# Patient Record
Sex: FEMALE | Race: WHITE | NOT HISPANIC OR LATINO | Employment: FULL TIME | URBAN - METROPOLITAN AREA
[De-identification: names, ages, dates, MRNs, and addresses within clinical notes are randomized per-mention and may not be internally consistent; named-entity substitution may affect disease eponyms.]

---

## 2017-03-09 ENCOUNTER — APPOINTMENT (OUTPATIENT)
Dept: CT IMAGING | Facility: HOSPITAL | Age: 52
End: 2017-03-09

## 2017-03-09 ENCOUNTER — HOSPITAL ENCOUNTER (EMERGENCY)
Facility: HOSPITAL | Age: 52
Discharge: HOME | End: 2017-03-09
Attending: EMERGENCY MEDICINE | Admitting: EMERGENCY MEDICINE

## 2017-03-09 VITALS
WEIGHT: 160 LBS | DIASTOLIC BLOOD PRESSURE: 87 MMHG | OXYGEN SATURATION: 94 % | RESPIRATION RATE: 16 BRPM | TEMPERATURE: 98.1 F | HEART RATE: 80 BPM | BODY MASS INDEX: 25.71 KG/M2 | SYSTOLIC BLOOD PRESSURE: 144 MMHG | HEIGHT: 66 IN

## 2017-03-09 DIAGNOSIS — R10.11 RIGHT UPPER QUADRANT ABDOMINAL PAIN: ICD-10-CM

## 2017-03-09 DIAGNOSIS — R10.9 FLANK PAIN: Primary | ICD-10-CM

## 2017-03-09 DIAGNOSIS — R07.81 RIB PAIN ON RIGHT SIDE: ICD-10-CM

## 2017-03-09 LAB
ALBUMIN SERPL-MCNC: 4.8 G/DL (ref 3.2–4.8)
ALBUMIN/GLOB SERPL: 1.4 G/DL (ref 1.5–2.5)
ALP SERPL-CCNC: 53 U/L (ref 25–100)
ALT SERPL W P-5'-P-CCNC: 15 U/L (ref 7–40)
ANION GAP SERPL CALCULATED.3IONS-SCNC: 4 MMOL/L (ref 3–11)
AST SERPL-CCNC: 25 U/L (ref 0–33)
BACTERIA UR QL AUTO: ABNORMAL /HPF
BASOPHILS # BLD AUTO: 0.07 10*3/MM3 (ref 0–0.2)
BASOPHILS NFR BLD AUTO: 0.5 % (ref 0–1)
BILIRUB SERPL-MCNC: 0.6 MG/DL (ref 0.3–1.2)
BILIRUB UR QL STRIP: NEGATIVE
BUN BLD-MCNC: 15 MG/DL (ref 9–23)
BUN/CREAT SERPL: 25 (ref 7–25)
CALCIUM SPEC-SCNC: 10.1 MG/DL (ref 8.7–10.4)
CHLORIDE SERPL-SCNC: 106 MMOL/L (ref 99–109)
CLARITY UR: ABNORMAL
CO2 SERPL-SCNC: 31 MMOL/L (ref 20–31)
COLOR UR: YELLOW
CREAT BLD-MCNC: 0.6 MG/DL (ref 0.6–1.3)
DEPRECATED RDW RBC AUTO: 44.1 FL (ref 37–54)
EOSINOPHIL # BLD AUTO: 0.25 10*3/MM3 (ref 0.1–0.3)
EOSINOPHIL NFR BLD AUTO: 1.9 % (ref 0–3)
ERYTHROCYTE [DISTWIDTH] IN BLOOD BY AUTOMATED COUNT: 13.3 % (ref 11.3–14.5)
GFR SERPL CREATININE-BSD FRML MDRD: 106 ML/MIN/1.73
GLOBULIN UR ELPH-MCNC: 3.5 GM/DL
GLUCOSE BLD-MCNC: 121 MG/DL (ref 70–100)
GLUCOSE UR STRIP-MCNC: NEGATIVE MG/DL
HCT VFR BLD AUTO: 41.2 % (ref 34.5–44)
HGB BLD-MCNC: 13.2 G/DL (ref 11.5–15.5)
HGB UR QL STRIP.AUTO: NEGATIVE
HOLD SPECIMEN: NORMAL
HOLD SPECIMEN: NORMAL
HYALINE CASTS UR QL AUTO: ABNORMAL /LPF
IMM GRANULOCYTES # BLD: 0.02 10*3/MM3 (ref 0–0.03)
IMM GRANULOCYTES NFR BLD: 0.2 % (ref 0–0.6)
KETONES UR QL STRIP: NEGATIVE
LEUKOCYTE ESTERASE UR QL STRIP.AUTO: NEGATIVE
LIPASE SERPL-CCNC: 32 U/L (ref 6–51)
LYMPHOCYTES # BLD AUTO: 2.25 10*3/MM3 (ref 0.6–4.8)
LYMPHOCYTES NFR BLD AUTO: 17.2 % (ref 24–44)
MCH RBC QN AUTO: 29.1 PG (ref 27–31)
MCHC RBC AUTO-ENTMCNC: 32 G/DL (ref 32–36)
MCV RBC AUTO: 90.7 FL (ref 80–99)
MONOCYTES # BLD AUTO: 0.96 10*3/MM3 (ref 0–1)
MONOCYTES NFR BLD AUTO: 7.3 % (ref 0–12)
NEUTROPHILS # BLD AUTO: 9.55 10*3/MM3 (ref 1.5–8.3)
NEUTROPHILS NFR BLD AUTO: 72.9 % (ref 41–71)
NITRITE UR QL STRIP: NEGATIVE
PH UR STRIP.AUTO: 7.5 [PH] (ref 5–8)
PLATELET # BLD AUTO: 369 10*3/MM3 (ref 150–450)
PMV BLD AUTO: 11.3 FL (ref 6–12)
POTASSIUM BLD-SCNC: 3.6 MMOL/L (ref 3.5–5.5)
PROT SERPL-MCNC: 8.3 G/DL (ref 5.7–8.2)
PROT UR QL STRIP: NEGATIVE
RBC # BLD AUTO: 4.54 10*6/MM3 (ref 3.89–5.14)
RBC # UR: ABNORMAL /HPF
REF LAB TEST METHOD: ABNORMAL
SODIUM BLD-SCNC: 141 MMOL/L (ref 132–146)
SP GR UR STRIP: 1.02 (ref 1–1.03)
SQUAMOUS #/AREA URNS HPF: ABNORMAL /HPF
UROBILINOGEN UR QL STRIP: ABNORMAL
WBC NRBC COR # BLD: 13.1 10*3/MM3 (ref 3.5–10.8)
WBC UR QL AUTO: ABNORMAL /HPF
WHOLE BLOOD HOLD SPECIMEN: NORMAL
WHOLE BLOOD HOLD SPECIMEN: NORMAL

## 2017-03-09 PROCEDURE — 85025 COMPLETE CBC W/AUTO DIFF WBC: CPT

## 2017-03-09 PROCEDURE — 96374 THER/PROPH/DIAG INJ IV PUSH: CPT

## 2017-03-09 PROCEDURE — 83690 ASSAY OF LIPASE: CPT

## 2017-03-09 PROCEDURE — 81001 URINALYSIS AUTO W/SCOPE: CPT

## 2017-03-09 PROCEDURE — 80053 COMPREHEN METABOLIC PANEL: CPT

## 2017-03-09 PROCEDURE — 99284 EMERGENCY DEPT VISIT MOD MDM: CPT

## 2017-03-09 PROCEDURE — 96375 TX/PRO/DX INJ NEW DRUG ADDON: CPT

## 2017-03-09 PROCEDURE — 25010000002 KETOROLAC TROMETHAMINE PER 15 MG: Performed by: EMERGENCY MEDICINE

## 2017-03-09 PROCEDURE — 96361 HYDRATE IV INFUSION ADD-ON: CPT

## 2017-03-09 PROCEDURE — 25010000002 METHYLPREDNISOLONE PER 125 MG: Performed by: EMERGENCY MEDICINE

## 2017-03-09 PROCEDURE — 74176 CT ABD & PELVIS W/O CONTRAST: CPT

## 2017-03-09 PROCEDURE — 25010000002 DIAZEPAM PER 5 MG: Performed by: EMERGENCY MEDICINE

## 2017-03-09 RX ORDER — DIAZEPAM 2 MG/1
2 TABLET ORAL EVERY 6 HOURS PRN
Qty: 12 TABLET | Refills: 0 | Status: SHIPPED | OUTPATIENT
Start: 2017-03-09 | End: 2020-06-22

## 2017-03-09 RX ORDER — MORPHINE SULFATE 2 MG/ML
2 INJECTION, SOLUTION INTRAMUSCULAR; INTRAVENOUS
Status: DISCONTINUED | OUTPATIENT
Start: 2017-03-09 | End: 2017-03-09 | Stop reason: HOSPADM

## 2017-03-09 RX ORDER — KETOROLAC TROMETHAMINE 30 MG/ML
30 INJECTION, SOLUTION INTRAMUSCULAR; INTRAVENOUS ONCE
Status: COMPLETED | OUTPATIENT
Start: 2017-03-09 | End: 2017-03-09

## 2017-03-09 RX ORDER — DIAZEPAM 5 MG/ML
5 INJECTION, SOLUTION INTRAMUSCULAR; INTRAVENOUS ONCE
Status: COMPLETED | OUTPATIENT
Start: 2017-03-09 | End: 2017-03-09

## 2017-03-09 RX ORDER — HYDROCODONE BITARTRATE AND ACETAMINOPHEN 5; 325 MG/1; MG/1
1-2 TABLET ORAL EVERY 6 HOURS PRN
Qty: 15 TABLET | Refills: 0 | Status: SHIPPED | OUTPATIENT
Start: 2017-03-09 | End: 2020-06-22

## 2017-03-09 RX ORDER — SODIUM CHLORIDE 0.9 % (FLUSH) 0.9 %
10 SYRINGE (ML) INJECTION AS NEEDED
Status: DISCONTINUED | OUTPATIENT
Start: 2017-03-09 | End: 2017-03-09 | Stop reason: HOSPADM

## 2017-03-09 RX ORDER — NAPROXEN 500 MG/1
500 TABLET ORAL 2 TIMES DAILY PRN
Qty: 12 TABLET | Refills: 0 | Status: SHIPPED | OUTPATIENT
Start: 2017-03-09 | End: 2022-04-25

## 2017-03-09 RX ORDER — CETIRIZINE HYDROCHLORIDE 10 MG/1
10 TABLET ORAL DAILY
COMMUNITY

## 2017-03-09 RX ORDER — METHYLPREDNISOLONE SODIUM SUCCINATE 125 MG/2ML
125 INJECTION, POWDER, LYOPHILIZED, FOR SOLUTION INTRAMUSCULAR; INTRAVENOUS ONCE
Status: COMPLETED | OUTPATIENT
Start: 2017-03-09 | End: 2017-03-09

## 2017-03-09 RX ORDER — ERGOCALCIFEROL (VITAMIN D2) 10 MCG
400 TABLET ORAL DAILY
COMMUNITY
End: 2020-06-22

## 2017-03-09 RX ORDER — PREDNISONE 50 MG/1
50 TABLET ORAL DAILY
Qty: 5 TABLET | Refills: 0 | Status: SHIPPED | OUTPATIENT
Start: 2017-03-09 | End: 2017-03-14

## 2017-03-09 RX ADMIN — SODIUM CHLORIDE 1000 ML: 9 INJECTION, SOLUTION INTRAVENOUS at 11:58

## 2017-03-09 RX ADMIN — DIAZEPAM 5 MG: 5 INJECTION, SOLUTION INTRAMUSCULAR; INTRAVENOUS at 11:59

## 2017-03-09 RX ADMIN — METHYLPREDNISOLONE SODIUM SUCCINATE 125 MG: 125 INJECTION, POWDER, FOR SOLUTION INTRAMUSCULAR; INTRAVENOUS at 11:58

## 2017-03-09 RX ADMIN — KETOROLAC TROMETHAMINE 30 MG: 30 INJECTION, SOLUTION INTRAMUSCULAR at 11:59

## 2017-03-09 NOTE — ED PROVIDER NOTES
Subjective   HPI Comments: Diana Fulton is a 51 y.o.female who presents to the ED c/o worsening, severe right flank pain, which onset 1 week ago. She states her pain will sometimes radiate forward to her abd, but notes she has had a cholecystectomy. She has vomited from the pain and has experienced chills, but denies any fevers, diarrhea, or urinary sx. Pt notes she has had these sx previously, which lasted approximately 2 months and resolved on its own. She saw Dr. Jackson after her pain had resolved who reviewed her MRI scan and indicated she had bulging discs which likely caused her sx. Pt states her pain from the feels identical to her pain today.    Patient is a 51 y.o. female presenting with flank pain.   History provided by:  Patient  Flank Pain   Pain location:  R flank  Pain radiates to:  RUQ  Pain severity:  Severe  Duration:  1 week  Timing:  Constant  Progression:  Worsening  Chronicity:  Recurrent  Associated symptoms: chills and vomiting    Associated symptoms: no diarrhea, no dysuria and no fever        Review of Systems   Constitutional: Positive for chills. Negative for fever.   Gastrointestinal: Positive for vomiting. Negative for diarrhea.   Genitourinary: Positive for flank pain. Negative for difficulty urinating and dysuria.   All other systems reviewed and are negative.      Past Medical History   Diagnosis Date   • Anxiety    • Bulging lumbar disc        No Known Allergies    Past Surgical History   Procedure Laterality Date   • Cholecystectomy     •  section     • Hysterectomy     • Tonsillectomy         Family History   Problem Relation Age of Onset   • Hypertension Father    • ALS Mother        Social History     Social History   • Marital status: Single     Spouse name: N/A   • Number of children: N/A   • Years of education: N/A     Social History Main Topics   • Smoking status: Former Smoker     Quit date: 2007   • Smokeless tobacco: None   • Alcohol use Yes      Comment:  moderate   • Drug use: No   • Sexual activity: Not Asked     Other Topics Concern   • None     Social History Narrative   • None         Objective   Physical Exam   Constitutional: She is oriented to person, place, and time. She appears well-developed and well-nourished.   HENT:   Head: Normocephalic and atraumatic.   Right Ear: External ear normal.   Left Ear: External ear normal.   Nose: Nose normal.   Eyes: Conjunctivae and EOM are normal.   Neck: Normal range of motion. Neck supple.   Cardiovascular: Normal rate, regular rhythm and normal heart sounds.  Exam reveals no gallop and no friction rub.    No murmur heard.  Pulmonary/Chest: Effort normal and breath sounds normal. No respiratory distress. She has no wheezes. She has no rales. She exhibits tenderness (Pain is reproducible with palpation of right 10th-12th ribs.).   Abdominal: Soft. There is no tenderness (No pain with palpation.).   Musculoskeletal: Normal range of motion.   Neurological: She is alert and oriented to person, place, and time.   Skin: Skin is warm and dry. No rash (No rash appreciated at site of reproducible chest pain.) noted.   Psychiatric: She has a normal mood and affect. Her behavior is normal. Judgment and thought content normal.   Nursing note and vitals reviewed.      Procedures         ED Course  ED Course   Comment By Time   Pt states she has temporary relief with IV medication, but pain is starting to come back. She is comfortable with outpatient management and she will return for worsening conditions.  ALISON Carroll 03/09 1402     Recent Results (from the past 24 hour(s))   Comprehensive Metabolic Panel    Collection Time: 03/09/17 11:34 AM   Result Value Ref Range    Glucose 121 (H) 70 - 100 mg/dL    BUN 15 9 - 23 mg/dL    Creatinine 0.60 0.60 - 1.30 mg/dL    Sodium 141 132 - 146 mmol/L    Potassium 3.6 3.5 - 5.5 mmol/L    Chloride 106 99 - 109 mmol/L    CO2 31.0 20.0 - 31.0 mmol/L    Calcium 10.1 8.7 - 10.4  mg/dL    Total Protein 8.3 (H) 5.7 - 8.2 g/dL    Albumin 4.80 3.20 - 4.80 g/dL    ALT (SGPT) 15 7 - 40 U/L    AST (SGOT) 25 0 - 33 U/L    Alkaline Phosphatase 53 25 - 100 U/L    Total Bilirubin 0.6 0.3 - 1.2 mg/dL    eGFR Non African Amer 106 >60 mL/min/1.73    Globulin 3.5 gm/dL    A/G Ratio 1.4 (L) 1.5 - 2.5 g/dL    BUN/Creatinine Ratio 25.0 7.0 - 25.0    Anion Gap 4.0 3.0 - 11.0 mmol/L   Lipase    Collection Time: 03/09/17 11:34 AM   Result Value Ref Range    Lipase 32 6 - 51 U/L   Urinalysis With / Culture If Indicated    Collection Time: 03/09/17 11:34 AM   Result Value Ref Range    Color, UA Yellow Yellow, Straw    Appearance, UA Cloudy (A) Clear    pH, UA 7.5 5.0 - 8.0    Specific Gravity, UA 1.018 1.001 - 1.030    Glucose, UA Negative Negative    Ketones, UA Negative Negative    Bilirubin, UA Negative Negative    Blood, UA Negative Negative    Protein, UA Negative Negative    Leuk Esterase, UA Negative Negative    Nitrite, UA Negative Negative    Urobilinogen, UA 1.0 E.U./dL 0.2 - 1.0 E.U./dL   Light Blue Top    Collection Time: 03/09/17 11:34 AM   Result Value Ref Range    Extra Tube hold for add-on    Green Top (Gel)    Collection Time: 03/09/17 11:34 AM   Result Value Ref Range    Extra Tube Hold for add-ons.    Lavender Top    Collection Time: 03/09/17 11:34 AM   Result Value Ref Range    Extra Tube hold for add-on    Gold Top - SST    Collection Time: 03/09/17 11:34 AM   Result Value Ref Range    Extra Tube Hold for add-ons.    CBC Auto Differential    Collection Time: 03/09/17 11:34 AM   Result Value Ref Range    WBC 13.10 (H) 3.50 - 10.80 10*3/mm3    RBC 4.54 3.89 - 5.14 10*6/mm3    Hemoglobin 13.2 11.5 - 15.5 g/dL    Hematocrit 41.2 34.5 - 44.0 %    MCV 90.7 80.0 - 99.0 fL    MCH 29.1 27.0 - 31.0 pg    MCHC 32.0 32.0 - 36.0 g/dL    RDW 13.3 11.3 - 14.5 %    RDW-SD 44.1 37.0 - 54.0 fl    MPV 11.3 6.0 - 12.0 fL    Platelets 369 150 - 450 10*3/mm3    Neutrophil % 72.9 (H) 41.0 - 71.0 %    Lymphocyte %  17.2 (L) 24.0 - 44.0 %    Monocyte % 7.3 0.0 - 12.0 %    Eosinophil % 1.9 0.0 - 3.0 %    Basophil % 0.5 0.0 - 1.0 %    Immature Grans % 0.2 0.0 - 0.6 %    Neutrophils, Absolute 9.55 (H) 1.50 - 8.30 10*3/mm3    Lymphocytes, Absolute 2.25 0.60 - 4.80 10*3/mm3    Monocytes, Absolute 0.96 0.00 - 1.00 10*3/mm3    Eosinophils, Absolute 0.25 0.10 - 0.30 10*3/mm3    Basophils, Absolute 0.07 0.00 - 0.20 10*3/mm3    Immature Grans, Absolute 0.02 0.00 - 0.03 10*3/mm3   Urinalysis, Microscopic Only    Collection Time: 03/09/17 11:34 AM   Result Value Ref Range    RBC, UA 3-6 (A) None Seen, 0-2 /HPF    WBC, UA 0-2 (A) None Seen /HPF    Bacteria, UA None Seen None Seen, Trace /HPF    Squamous Epithelial Cells, UA 0-2 None Seen, 0-2 /HPF    Hyaline Casts, UA 0-6 0 - 6 /LPF    Methodology Automated Microscopy      Note: In addition to lab results from this visit, the labs listed above may include labs taken at another facility or during a different encounter within the last 24 hours. Please correlate lab times with ED admission and discharge times for further clarification of the services performed during this visit.    CT Abdomen Pelvis Without Contrast   Final Result   There is a 3.4 cm left ovarian cyst. There is no other   abnormality.       D:  03/09/2017   E:  03/09/2017           This report was finalized on 3/9/2017 1:51 PM by Dr. Evan Brown MD.            Vitals:    03/09/17 1223 03/09/17 1230 03/09/17 1330 03/09/17 1430   BP: 129/74 118/84 136/83 144/87   BP Location:       Patient Position:       Pulse:       Resp:       Temp:       TempSrc:       SpO2: 96% 95% 95% 94%   Weight:       Height:         Medications   sodium chloride 0.9 % bolus 1,000 mL (0 mL Intravenous Stopped 3/9/17 1452)   methylPREDNISolone sodium succinate (SOLU-Medrol) injection 125 mg (125 mg Intravenous Given 3/9/17 1158)   diazePAM (VALIUM) injection 5 mg (5 mg Intravenous Given 3/9/17 1159)   ketorolac (TORADOL) injection 30 mg (30 mg  Intravenous Given 3/9/17 1159)     ECG/EMG Results (last 24 hours)     ** No results found for the last 24 hours. **        As pt has no abdominal pain with palpation, imaging is not indicated at this time.  Pain is fully reproduced with palpation of the right lower posterior ribs.                MDM    Final diagnoses:   Flank pain   Rib pain on right side   Right upper quadrant abdominal pain       Documentation assistance provided by felipa Carroll.  Information recorded by the [email or web address] was done at my direction and has been verified and validated by me.     Vidal Carroll  03/09/17 1235       Vidal Carroll  03/09/17 1402       Vidal Carroll  03/09/17 1405       Jose Ramon Christensen DO  03/09/17 0864

## 2017-03-17 ENCOUNTER — OFFICE VISIT (OUTPATIENT)
Dept: NEUROSURGERY | Facility: CLINIC | Age: 52
End: 2017-03-17

## 2017-03-17 VITALS — HEIGHT: 66 IN | BODY MASS INDEX: 30.7 KG/M2 | TEMPERATURE: 97.8 F | WEIGHT: 191 LBS

## 2017-03-17 DIAGNOSIS — M54.50 CHRONIC RIGHT-SIDED LOW BACK PAIN WITHOUT SCIATICA: Primary | ICD-10-CM

## 2017-03-17 DIAGNOSIS — G89.29 CHRONIC RIGHT-SIDED LOW BACK PAIN WITHOUT SCIATICA: Primary | ICD-10-CM

## 2017-03-17 PROCEDURE — 99215 OFFICE O/P EST HI 40 MIN: CPT | Performed by: NEUROLOGICAL SURGERY

## 2017-03-17 NOTE — PROGRESS NOTES
Subjective     Chief Complaint: Right-sided low back pain    Patient ID: Diana Fulton is a 51 y.o. female is here today for follow-up.    Back Pain   This is a chronic problem. The current episode started more than 1 month ago. The problem occurs daily. The problem has been waxing and waning since onset. The pain is present in the lumbar spine. The quality of the pain is described as aching and cramping. The pain does not radiate. The pain is at a severity of 9/10. The pain is severe. The pain is worse during the day. The symptoms are aggravated by twisting and sitting. Stiffness is present in the morning. Pertinent negatives include no abdominal pain, bladder incontinence, bowel incontinence, chest pain, dysuria, fever, headaches, leg pain, numbness, paresis, paresthesias, pelvic pain, perianal numbness, tingling, weakness or weight loss. Risk factors include lack of exercise and sedentary lifestyle. She has tried chiropractic manipulation, heat and NSAIDs for the symptoms. The treatment provided mild relief.       This is a 51-year-old woman with a chronic history of intermittent, right sided low back pain without sciatica.  I saw her in consultation several months ago, but her pain was getting better, so she deferred my recommendation for physical therapy.  She had a flareup several weeks ago which landed her in the emergency department.  She reports that her pain is gotten a little bit better, but she is eager to try some different therapies to prevent any more flareups.  She denies any bladder/bowel incontinence.  She has been walking, and undergoing some chiropractic therapy but has not undergone any physical therapy.    The following portions of the patient's history were reviewed and updated as appropriate: allergies, current medications, past family history, past medical history, past social history, past surgical history and problem list.    Family history:   Family History   Problem Relation Age of  Onset   • Hypertension Father    • ALS Mother        Social history:   Social History     Social History   • Marital status: Single     Spouse name: N/A   • Number of children: N/A   • Years of education: N/A     Occupational History   • Not on file.     Social History Main Topics   • Smoking status: Former Smoker     Quit date: 8/26/2007   • Smokeless tobacco: Not on file   • Alcohol use Yes      Comment: moderate   • Drug use: No   • Sexual activity: Not on file     Other Topics Concern   • Not on file     Social History Narrative       Review of Systems   Constitutional: Negative for activity change, appetite change, chills, diaphoresis, fatigue, fever, unexpected weight change and weight loss.   HENT: Negative for congestion, dental problem, drooling, ear discharge, ear pain, facial swelling, hearing loss, mouth sores, nosebleeds, postnasal drip, rhinorrhea, sinus pressure, sneezing, sore throat, tinnitus, trouble swallowing and voice change.    Eyes: Negative for photophobia, pain, discharge, redness, itching and visual disturbance.   Respiratory: Negative for apnea, cough, choking, chest tightness, shortness of breath, wheezing and stridor.    Cardiovascular: Negative for chest pain, palpitations and leg swelling.   Gastrointestinal: Negative for abdominal distention, abdominal pain, anal bleeding, blood in stool, bowel incontinence, constipation, diarrhea, nausea, rectal pain and vomiting.   Endocrine: Negative for cold intolerance, heat intolerance, polydipsia, polyphagia and polyuria.   Genitourinary: Negative for bladder incontinence, decreased urine volume, difficulty urinating, dysuria, enuresis, flank pain, frequency, genital sores, hematuria, pelvic pain and urgency.   Musculoskeletal: Positive for back pain. Negative for arthralgias, gait problem, joint swelling, myalgias, neck pain and neck stiffness.   Skin: Negative for color change, pallor, rash and wound.   Allergic/Immunologic: Negative for  "environmental allergies, food allergies and immunocompromised state.   Neurological: Negative for dizziness, tingling, tremors, seizures, syncope, facial asymmetry, speech difficulty, weakness, light-headedness, numbness, headaches and paresthesias.   Hematological: Negative for adenopathy. Does not bruise/bleed easily.   Psychiatric/Behavioral: Positive for sleep disturbance. Negative for agitation, behavioral problems, confusion, decreased concentration, dysphoric mood, hallucinations, self-injury and suicidal ideas. The patient is not nervous/anxious and is not hyperactive.    All other systems reviewed and are negative.      Objective   Height 66\" (167.6 cm), weight 160 lb (72.6 kg).  Body mass index is 25.82 kg/(m^2).    Physical Exam   Constitutional: She is oriented to person, place, and time. She appears well-developed.  Non-toxic appearance.   HENT:   Head: Normocephalic and atraumatic.   Right Ear: Hearing normal.   Left Ear: Hearing normal.   Nose: Nose normal.   Eyes: Conjunctivae, EOM and lids are normal. Pupils are equal, round, and reactive to light.   Neck: Normal range of motion. No JVD present.   Cardiovascular: Normal rate and regular rhythm.    Pulses:       Radial pulses are 2+ on the right side, and 2+ on the left side.        Dorsalis pedis pulses are 1+ on the right side, and 1+ on the left side.        Posterior tibial pulses are 1+ on the right side, and 1+ on the left side.   Pulmonary/Chest: Effort normal. No stridor. No respiratory distress. She has no wheezes.   Musculoskeletal:        Lumbar back: She exhibits decreased range of motion, tenderness and pain.        Back:    Neurological: She is alert and oriented to person, place, and time. She displays abnormal reflex. No cranial nerve deficit. She exhibits normal muscle tone. GCS eye subscore is 4. GCS verbal subscore is 5. GCS motor subscore is 6.   Reflex Scores:       Patellar reflexes are 1+ on the right side and 1+ on the left " side.       Achilles reflexes are 1+ on the right side and 1+ on the left side.  Skin: Skin is warm and dry. No rash noted. No erythema.   Psychiatric: She has a normal mood and affect. Her behavior is normal. Judgment and thought content normal.   Nursing note and vitals reviewed.        Assessment/Plan     Independent Review of Radiographic Studies:      She has no new imaging for me to review.  She has an unremarkable MRI of her lumbar spine, with some mild degenerative disc disease, but nothing that would explain her current symptomatology.    Medical Decision Making:      This is a 51-year-old woman with recurrent low back pain.  My recommendation is for aggressive physical therapy.  There is no structural etiology for her low back pain on her most recent MRI, and her symptoms have not significantly changed.  My suspicion is that she is having spasm of her quadratus lumborum or oblique muscles on the right side.  She is an excellent candidate for physical therapy.  I would like to follow up with her in 6 months, or sooner if clinically indicated.  I did review the signs and symptoms of lumbosacral radiculopathy with her.  I directed her to contact my office with new, or worsening symptoms.    There are no diagnoses linked to this encounter.    No Follow-up on file.           This document signed by MARIO Jackson MD March 17, 2017 10:06 AM

## 2020-06-22 ENCOUNTER — OFFICE VISIT (OUTPATIENT)
Dept: ENDOCRINOLOGY | Facility: CLINIC | Age: 55
End: 2020-06-22

## 2020-06-22 VITALS
OXYGEN SATURATION: 98 % | HEIGHT: 66 IN | BODY MASS INDEX: 33.59 KG/M2 | HEART RATE: 76 BPM | WEIGHT: 209 LBS | SYSTOLIC BLOOD PRESSURE: 110 MMHG | DIASTOLIC BLOOD PRESSURE: 80 MMHG

## 2020-06-22 DIAGNOSIS — E04.2 MULTINODULAR GOITER (NONTOXIC): Primary | ICD-10-CM

## 2020-06-22 PROCEDURE — 76536 US EXAM OF HEAD AND NECK: CPT | Performed by: INTERNAL MEDICINE

## 2020-06-22 PROCEDURE — 99243 OFF/OP CNSLTJ NEW/EST LOW 30: CPT | Performed by: INTERNAL MEDICINE

## 2020-06-22 RX ORDER — ESTRADIOL 10 UG/1
1 INSERT VAGINAL AS NEEDED
COMMUNITY
Start: 2020-04-28

## 2020-07-05 PROBLEM — E04.2 MULTINODULAR GOITER (NONTOXIC): Status: ACTIVE | Noted: 2020-07-05

## 2020-07-05 NOTE — PROGRESS NOTES
"Chief Complaint   Patient presents with   • Thyroid nodule     Consult for Olivia ARTEAGA        HPI:   Diana Fulton is a 55 y.o.female sent in consultation by CHANDLER Gerber for further evaluation of pt's thyroid gland. Her history is as follows:    - Patient reports that she was found to have a positive hep C antibody test during evaluation for hematuria.  She was sent to hepatologist for further evaluation who felt a goiter on physical exam.  This was further evaluated with a thyroid ultrasound at [facility] completed on 2020.  The radiology report's impression stated \"there are multiple bilateral thyroid nodules.  These are of low risk and no additional follow-up is required.\"  (2020) TSH was normal at 0.94    -Patient was sent in consultation for further evaluation of her thyroid gland.     FMH: no known thyroid cancer, no known thyroid disease  PMH: no h/o irradiation of head, neck, or chest    Review of Systems   Constitutional: Negative.    HENT: Negative.    Eyes: Negative.    Respiratory: Negative.    Cardiovascular: Negative.    Gastrointestinal: Negative.    Endocrine: Negative.    Genitourinary: Negative.    Musculoskeletal: Positive for arthralgias and back pain.   Skin: Negative.    Allergic/Immunologic: Negative.    Neurological: Negative.    Hematological: Negative.    Psychiatric/Behavioral: Negative.         H/o anxiety       Past Medical History:   Diagnosis Date   • Anxiety    • Bulging lumbar disc    • Depression    • Joint pain     elbows and knees    • Rash      family history includes ALS in her mother; Cancer in her maternal grandmother and paternal grandfather; Hypertension in her father.  Past Surgical History:   Procedure Laterality Date   •  SECTION     • CHOLECYSTECTOMY     • HYSTERECTOMY     • TONSILLECTOMY       Social History     Tobacco Use   • Smoking status: Former Smoker     Last attempt to quit: 2007     " "Years since quittin.8   • Smokeless tobacco: Never Used   Substance Use Topics   • Alcohol use: Yes     Comment: moderate   • Drug use: No     Outpatient Medications Prior to Visit   Medication Sig Dispense Refill   • cetirizine (zyrTEC) 10 MG tablet Take 10 mg by mouth Daily.     • estradiol (VAGIFEM) 10 MCG tablet vaginal tablet      • hydrOXYzine (VISTARIL) 25 MG capsule Take 25 mg by mouth every 6 (six) hours as needed for itching.     • Multiple Vitamins-Minerals (MULTIVITAMIN ADULT PO) Take  by mouth.     • naproxen (NAPROSYN) 500 MG tablet Take 1 tablet by mouth 2 (Two) Times a Day As Needed for moderate pain (4-6). 12 tablet 0   • cyclobenzaprine (FLEXERIL) 10 MG tablet Take 10 mg by mouth 3 (three) times a day as needed for muscle spasms.     • diazePAM (VALIUM) 2 MG tablet Take 1 tablet by mouth Every 6 (Six) Hours As Needed for Anxiety. 12 tablet 0   • DICLOFENAC PO Take 50 mg by mouth 2 (two) times a day as needed.     • HYDROcodone-acetaminophen (NORCO) 5-325 MG per tablet Take 1-2 tablets by mouth Every 6 (Six) Hours As Needed for severe pain (7-10). 15 tablet 0   • sertraline (ZOLOFT) 50 MG tablet Take 50 mg by mouth daily. 1.5 tabs     • Vitamin D, Cholecalciferol, (CHOLECALCIFEROL) 400 UNITS tablet Take 400 Units by mouth Daily.       No facility-administered medications prior to visit.      No Known Allergies    /80   Pulse 76   Ht 167.6 cm (66\")   Wt 94.8 kg (209 lb)   SpO2 98%   BMI 33.73 kg/m²   Physical Exam   Constitutional: She is oriented to person, place, and time. She appears well-developed. No distress.   HENT:   Head: Normocephalic.   Mouth/Throat: Oropharynx is clear and moist.   Eyes: Pupils are equal, round, and reactive to light. Conjunctivae and EOM are normal.   Neck: No tracheal deviation present. Thyromegaly (right lobe mildly enlarged) present.   1 cm palpable thyroid nodule in right lobe     Cardiovascular: Normal rate, regular rhythm and normal heart sounds. "   No murmur heard.  Pulmonary/Chest: Effort normal and breath sounds normal. No respiratory distress.   Lymphadenopathy:     She has no cervical adenopathy.   Neurological: She is alert and oriented to person, place, and time. No cranial nerve deficit.   Skin: Skin is warm and dry. She is not diaphoretic. No erythema.   Psychiatric: She has a normal mood and affect. Her behavior is normal.   Vitals reviewed.      LABS/IMAGING: outside records reviewed and summarized in HPI    PROCEDURES (in office):  Thyroid Ultrasound Report  [facility]  [place], [place]    Date: 06/22/2020  Indication: thyroid nodule  Comparison Imaging: none  Clinical History: 55 y.o. Female with h/o thyroid nodule on outside imaging. H/O normal thyroid function tests.     Real time high resolution imaging of the thyroid gland was performed in transverse and longitudinal planes.  The right lobe measured 4.78 cm in Length x 1.87 cm in AP diameter x 2.34 cm in TV dimension.  The isthmus measured 0.26 cm in thickness.  The left thyroid lobe measured 4.89 cm in length x 1.44 cm in AP diameter x 1.72 cm in TV dimension.    The thyroid gland appeared overall isoechoic with slightly heterogeneous echotexture.    In the right mid lobe, a 0.89(L) x 0.70(AP) x 0.81(T) cm isoechoic, mixed solid and cystic nodule was identified. The nodule had a smooth margin, minimal peripheral vascularity, and no microcalcifications.    In the right inferior lobe, a 1.35(L) x 0.89(AP) x 1.15(T) cm isoechoic, spongiform nodule was identified. The nodule had a smooth margin, peripheral vascularity, and no microcalcifications.     At the junction of the right lobe and isthmus, a 1.07(L) x 0.74(AP) x 1.05(T) cm isoechoic, spongiform nodule was identified. The nodule had a smooth margin, peripheral vascularity, and no microcalcifications.     At the junction of the right lobe and isthmus, a subcentimeter predominantly cystic nodule was identified. The cystic  nodule had a smooth margin, no vascularity, and no microcalcifications.     A few subcentimeter cysts were seen scattered throughout the left lobe.     No pathologic lymph nodes were seen.     IMPRESSION:   1) Asymmetric, mildly enlarged thyroid gland with the right lobe mildly enlarged and the left lobe normal in size. Findings were consistent with multinodular goiter.    2) A few subcentimeter cysts were seen scattered throughout the left lobe.   3) In the right mid lobe, a 0.89(L) x 0.70(AP) x 0.81(T) cm isoechoic, mixed solid and cystic nodule was identified. The nodule had a smooth margin, minimal peripheral vascularity, and no microcalcifications.  4) At the junction of the right lobe and isthmus, a 1.07(L) x 0.74(AP) x 1.05(T) cm isoechoic, spongiform nodule was identified. The nodule had a smooth margin, peripheral vascularity, and no microcalcifications.   5) The nodules identified lacked suspicious US characteristics and did not meet criteria for FNA.    RECOMMENDATIONS: Repeat thyroid US recommended to the patient in one year.      Signed: Marce Savage MD      ASSESSMENT/PLAN:    1) multinodular goiter, nontoxic, nonobstructive: Thyroid ultrasound completed in clinic today showed -   - Asymmetric, mildly enlarged thyroid gland with the right lobe mildly enlarged and the left lobe normal in size. Findings were consistent with multinodular goiter.    - A few subcentimeter cysts were seen scattered throughout the left lobe.   - In the right mid lobe, a 0.89(L) x 0.70(AP) x 0.81(T) cm isoechoic, mixed solid and cystic nodule was identified. The nodule had a smooth margin, minimal peripheral vascularity, and no microcalcifications.  - At the junction of the right lobe and isthmus, a 1.07(L) x 0.74(AP) x 1.05(T) cm isoechoic, spongiform nodule was identified. The nodule had a smooth margin, peripheral vascularity, and no microcalcifications.   - The nodules identified lacked suspicious US characteristics and  did not meet criteria for FNA.    RECOMMENDATIONS: Repeat thyroid US recommended to the patient in one year.     Patient scheduled to return to clinic in 1 year for follow-up ultrasound.    Reviewed ultrasound images with patient.  Reassured patient that her nodules have benign appearing ultrasound characteristics.    Counseling was given to patient for the following topics: clinical significance of thyroid nodules, risk factors for thyroid cancer, indications for FNA, and reviewing US images with patient . Total face to face time of the encounter was 40 minutes and 30 minutes was spent counseling.      Signed: Marce Savage MD

## 2020-07-05 NOTE — PROGRESS NOTES
Thyroid Ultrasound Report  [facility]  [place], [place]    Date: 06/22/2020  Indication: thyroid nodule  Comparison Imaging: none  Clinical History: 55 y.o. Female with h/o thyroid nodule on outside imaging. H/O normal thyroid function tests.     Real time high resolution imaging of the thyroid gland was performed in transverse and longitudinal planes.  The right lobe measured 4.78 cm in Length x 1.87 cm in AP diameter x 2.34 cm in TV dimension.  The isthmus measured 0.26 cm in thickness.  The left thyroid lobe measured 4.89 cm in length x 1.44 cm in AP diameter x 1.72 cm in TV dimension.    The thyroid gland appeared overall isoechoic with slightly heterogeneous echotexture.    In the right mid lobe, a 0.89(L) x 0.70(AP) x 0.81(T) cm isoechoic, mixed solid and cystic nodule was identified. The nodule had a smooth margin, minimal peripheral vascularity, and no microcalcifications.    In the right inferior lobe, a 1.35(L) x 0.89(AP) x 1.15(T) cm isoechoic, spongiform nodule was identified. The nodule had a smooth margin, peripheral vascularity, and no microcalcifications.     At the junction of the right lobe and isthmus, a 1.07(L) x 0.74(AP) x 1.05(T) cm isoechoic, spongiform nodule was identified. The nodule had a smooth margin, peripheral vascularity, and no microcalcifications.     At the junction of the right lobe and isthmus, a subcentimeter predominantly cystic nodule was identified. The cystic nodule had a smooth margin, no vascularity, and no microcalcifications.     A few subcentimeter cysts were seen scattered throughout the left lobe.     No pathologic lymph nodes were seen.     IMPRESSION:   1) Asymmetric, mildly enlarged thyroid gland with the right lobe mildly enlarged and the left lobe normal in size. Findings were consistent with multinodular goiter.    2) A few subcentimeter cysts were seen scattered throughout the left lobe.   3) In the right mid lobe, a 0.89(L) x 0.70(AP) x 0.81(T)  cm isoechoic, mixed solid and cystic nodule was identified. The nodule had a smooth margin, minimal peripheral vascularity, and no microcalcifications.  4) At the junction of the right lobe and isthmus, a 1.07(L) x 0.74(AP) x 1.05(T) cm isoechoic, spongiform nodule was identified. The nodule had a smooth margin, peripheral vascularity, and no microcalcifications.   5) The nodules identified lacked suspicious US characteristics and did not meet criteria for FNA.    RECOMMENDATIONS: Repeat thyroid US recommended to the patient in one year.      Signed: Marce Savage MD

## 2022-04-21 ENCOUNTER — HOSPITAL ENCOUNTER (EMERGENCY)
Facility: HOSPITAL | Age: 57
Discharge: HOME | End: 2022-04-21
Attending: EMERGENCY MEDICINE | Admitting: EMERGENCY MEDICINE

## 2022-04-21 ENCOUNTER — APPOINTMENT (OUTPATIENT)
Dept: GENERAL RADIOLOGY | Facility: HOSPITAL | Age: 57
End: 2022-04-21

## 2022-04-21 VITALS
RESPIRATION RATE: 18 BRPM | DIASTOLIC BLOOD PRESSURE: 69 MMHG | TEMPERATURE: 98.3 F | BODY MASS INDEX: 34.32 KG/M2 | HEART RATE: 84 BPM | HEIGHT: 65 IN | WEIGHT: 206 LBS | OXYGEN SATURATION: 94 % | SYSTOLIC BLOOD PRESSURE: 125 MMHG

## 2022-04-21 DIAGNOSIS — R07.9 CHEST PAIN, UNSPECIFIED TYPE: Primary | ICD-10-CM

## 2022-04-21 LAB
ALBUMIN SERPL-MCNC: 4.5 G/DL (ref 3.5–5.2)
ALBUMIN/GLOB SERPL: 1.8 G/DL
ALP SERPL-CCNC: 57 U/L (ref 39–117)
ALT SERPL W P-5'-P-CCNC: 12 U/L (ref 1–33)
ANION GAP SERPL CALCULATED.3IONS-SCNC: 10 MMOL/L (ref 5–15)
AST SERPL-CCNC: 21 U/L (ref 1–32)
BASOPHILS # BLD AUTO: 0.09 10*3/MM3 (ref 0–0.2)
BASOPHILS NFR BLD AUTO: 1.1 % (ref 0–1.5)
BILIRUB SERPL-MCNC: 0.2 MG/DL (ref 0–1.2)
BUN SERPL-MCNC: 16 MG/DL (ref 6–20)
BUN/CREAT SERPL: 21.3 (ref 7–25)
CALCIUM SPEC-SCNC: 9.5 MG/DL (ref 8.6–10.5)
CHLORIDE SERPL-SCNC: 106 MMOL/L (ref 98–107)
CO2 SERPL-SCNC: 25 MMOL/L (ref 22–29)
CREAT SERPL-MCNC: 0.75 MG/DL (ref 0.57–1)
DEPRECATED RDW RBC AUTO: 41.1 FL (ref 37–54)
EGFRCR SERPLBLD CKD-EPI 2021: 94.7 ML/MIN/1.73
EOSINOPHIL # BLD AUTO: 0.19 10*3/MM3 (ref 0–0.4)
EOSINOPHIL NFR BLD AUTO: 2.3 % (ref 0.3–6.2)
ERYTHROCYTE [DISTWIDTH] IN BLOOD BY AUTOMATED COUNT: 12.6 % (ref 12.3–15.4)
GLOBULIN UR ELPH-MCNC: 2.5 GM/DL
GLUCOSE SERPL-MCNC: 100 MG/DL (ref 65–99)
HCT VFR BLD AUTO: 37.9 % (ref 34–46.6)
HGB BLD-MCNC: 12.8 G/DL (ref 12–15.9)
HOLD SPECIMEN: NORMAL
IMM GRANULOCYTES # BLD AUTO: 0.01 10*3/MM3 (ref 0–0.05)
IMM GRANULOCYTES NFR BLD AUTO: 0.1 % (ref 0–0.5)
LIPASE SERPL-CCNC: 30 U/L (ref 13–60)
LYMPHOCYTES # BLD AUTO: 2.15 10*3/MM3 (ref 0.7–3.1)
LYMPHOCYTES NFR BLD AUTO: 25.8 % (ref 19.6–45.3)
MCH RBC QN AUTO: 30.1 PG (ref 26.6–33)
MCHC RBC AUTO-ENTMCNC: 33.8 G/DL (ref 31.5–35.7)
MCV RBC AUTO: 89.2 FL (ref 79–97)
MONOCYTES # BLD AUTO: 0.72 10*3/MM3 (ref 0.1–0.9)
MONOCYTES NFR BLD AUTO: 8.6 % (ref 5–12)
NEUTROPHILS NFR BLD AUTO: 5.18 10*3/MM3 (ref 1.7–7)
NEUTROPHILS NFR BLD AUTO: 62.1 % (ref 42.7–76)
NRBC BLD AUTO-RTO: 0 /100 WBC (ref 0–0.2)
NT-PROBNP SERPL-MCNC: 34 PG/ML (ref 0–900)
PLATELET # BLD AUTO: 298 10*3/MM3 (ref 140–450)
PMV BLD AUTO: 11.3 FL (ref 6–12)
POTASSIUM SERPL-SCNC: 4.1 MMOL/L (ref 3.5–5.2)
PROT SERPL-MCNC: 7 G/DL (ref 6–8.5)
RBC # BLD AUTO: 4.25 10*6/MM3 (ref 3.77–5.28)
SODIUM SERPL-SCNC: 141 MMOL/L (ref 136–145)
TROPONIN T SERPL-MCNC: <0.01 NG/ML (ref 0–0.03)
TROPONIN T SERPL-MCNC: <0.01 NG/ML (ref 0–0.03)
WBC NRBC COR # BLD: 8.34 10*3/MM3 (ref 3.4–10.8)
WHOLE BLOOD HOLD SPECIMEN: NORMAL
WHOLE BLOOD HOLD SPECIMEN: NORMAL

## 2022-04-21 PROCEDURE — 36415 COLL VENOUS BLD VENIPUNCTURE: CPT

## 2022-04-21 PROCEDURE — 83690 ASSAY OF LIPASE: CPT | Performed by: EMERGENCY MEDICINE

## 2022-04-21 PROCEDURE — 99284 EMERGENCY DEPT VISIT MOD MDM: CPT

## 2022-04-21 PROCEDURE — 71045 X-RAY EXAM CHEST 1 VIEW: CPT

## 2022-04-21 PROCEDURE — 83880 ASSAY OF NATRIURETIC PEPTIDE: CPT | Performed by: EMERGENCY MEDICINE

## 2022-04-21 PROCEDURE — 84484 ASSAY OF TROPONIN QUANT: CPT | Performed by: EMERGENCY MEDICINE

## 2022-04-21 PROCEDURE — 93005 ELECTROCARDIOGRAM TRACING: CPT | Performed by: EMERGENCY MEDICINE

## 2022-04-21 PROCEDURE — 80053 COMPREHEN METABOLIC PANEL: CPT | Performed by: EMERGENCY MEDICINE

## 2022-04-21 PROCEDURE — 85025 COMPLETE CBC W/AUTO DIFF WBC: CPT | Performed by: EMERGENCY MEDICINE

## 2022-04-21 RX ORDER — ALBUTEROL SULFATE 90 UG/1
2 AEROSOL, METERED RESPIRATORY (INHALATION) EVERY 6 HOURS PRN
Qty: 18 G | Refills: 0 | Status: SHIPPED | OUTPATIENT
Start: 2022-04-21

## 2022-04-21 RX ORDER — SODIUM CHLORIDE 0.9 % (FLUSH) 0.9 %
10 SYRINGE (ML) INJECTION AS NEEDED
Status: DISCONTINUED | OUTPATIENT
Start: 2022-04-21 | End: 2022-04-21 | Stop reason: HOSPADM

## 2022-04-21 RX ORDER — ASPIRIN 81 MG/1
324 TABLET, CHEWABLE ORAL ONCE
Status: DISCONTINUED | OUTPATIENT
Start: 2022-04-21 | End: 2022-04-21 | Stop reason: HOSPADM

## 2022-04-21 RX ORDER — NITROGLYCERIN 0.4 MG/1
0.4 TABLET SUBLINGUAL
Status: DISCONTINUED | OUTPATIENT
Start: 2022-04-21 | End: 2022-04-21 | Stop reason: HOSPADM

## 2022-04-21 RX ADMIN — NITROGLYCERIN 0.4 MG: 0.4 TABLET SUBLINGUAL at 17:59

## 2022-04-21 NOTE — DISCHARGE INSTRUCTIONS
Examination and testing today did not reveal a specific cause of your chest pain.  It is important to understand that this does not mean “nothing is wrong” but rather that there is currently no evidence to support a specific diagnosis.  Some diseases, like heart disease, can be very serious but develop slowly over time and often the testing available in the ED will be “normal” or “nondiagnostic” even when significant blockage in the arteries is present.  This is why it is very important for you to have close followup for further testing, for example a stress test.  You are being referred to our chest pain followup clinic to help facilitate and expedite this further testing.  Please do not put off or delay further evaluation as the consequences may be severe.  If at any time you have worsening symptoms or develop any change in your condition that concerns you, please return to the ED for immediate evaluation.

## 2022-04-22 LAB
QT INTERVAL: 366 MS
QT INTERVAL: 386 MS
QTC INTERVAL: 427 MS
QTC INTERVAL: 445 MS

## 2022-04-22 NOTE — ED PROVIDER NOTES
Subjective   Pt presents with chest pain.  She has had several days of waxing and waning tightness associated with dyspena.  Not exertional.  No sweating, dizziness or nausea.  No fever, cough, myalgias or sore throat.  Hasn't tried anything for symptoms.  She went to [facility] today and they sent her here.    She has new dx breast cancer, she says stage 0 DCIS and she is scheduled for a lumpectomy later this month.      History provided by:  Patient      Review of Systems   Constitutional: Negative for diaphoresis, fatigue and fever.   Respiratory: Positive for shortness of breath. Negative for cough.    Cardiovascular: Positive for chest pain.   Gastrointestinal: Negative for nausea and vomiting.   Musculoskeletal: Negative for myalgias.   Neurological: Negative for dizziness.   All other systems reviewed and are negative.      Past Medical History:   Diagnosis Date   • Anxiety    • Bulging lumbar disc    • Depression    • Joint pain     elbows and knees    • Rash        No Known Allergies    Past Surgical History:   Procedure Laterality Date   •  SECTION     • CHOLECYSTECTOMY     • HYSTERECTOMY     • TONSILLECTOMY         Family History   Problem Relation Age of Onset   • Hypertension Father    • ALS Mother    • Cancer Maternal Grandmother    • Cancer Paternal Grandfather        Social History     Socioeconomic History   • Marital status: Single   Tobacco Use   • Smoking status: Former Smoker     Quit date: 2007     Years since quittin.6   • Smokeless tobacco: Never Used   Substance and Sexual Activity   • Alcohol use: Yes     Comment: moderate   • Drug use: No   • Sexual activity: Defer           Objective   Physical Exam  Vitals and nursing note reviewed.   Constitutional:       General: She is not in acute distress.     Appearance: Normal appearance. She is not ill-appearing.   HENT:      Head: Normocephalic and atraumatic.      Mouth/Throat:      Mouth: Mucous membranes are moist.   Eyes:       General: No scleral icterus.        Right eye: No discharge.         Left eye: No discharge.      Conjunctiva/sclera: Conjunctivae normal.   Cardiovascular:      Rate and Rhythm: Normal rate and regular rhythm.      Heart sounds: No murmur heard.  Pulmonary:      Effort: Pulmonary effort is normal. No respiratory distress.      Breath sounds: Normal breath sounds. No wheezing.   Abdominal:      General: Bowel sounds are normal. There is no distension.      Palpations: Abdomen is soft.      Tenderness: There is no abdominal tenderness. There is no guarding or rebound.   Musculoskeletal:         General: No swelling. Normal range of motion.      Cervical back: Normal range of motion and neck supple.   Skin:     General: Skin is warm and dry.      Findings: No rash.   Neurological:      General: No focal deficit present.      Mental Status: She is alert and oriented to person, place, and time. Mental status is at baseline.   Psychiatric:         Mood and Affect: Mood normal.         Behavior: Behavior normal.         Thought Content: Thought content normal.         Procedures           ED Course         EKG NSR, PRWP.  Reviewed UTC EKG, there are no concerning changes.  CXR NAD.  Labs benign.  No change with NTG.    HEART = 3.    Patient stable on serial rechecks.  Discussed findings, concerns, plan of care, expected course, reasons to return and followup.  Provided the opportunity to ask questions.                                          MDM  Number of Diagnoses or Management Options     Amount and/or Complexity of Data Reviewed  Clinical lab tests: reviewed and ordered  Tests in the radiology section of CPT®: reviewed and ordered  Decide to obtain previous medical records or to obtain history from someone other than the patient: yes  Review and summarize past medical records: yes  Independent visualization of images, tracings, or specimens: yes        Final diagnoses:   Chest pain, unspecified type       ED  Disposition  ED Disposition     ED Disposition   Discharge    Condition   Stable    Comment   --             [facility]  [address]  [address]  [facility] [address] [phone]  Schedule an appointment as soon as possible for a visit            Medication List      New Prescriptions    albuterol sulfate  (90 Base) MCG/ACT inhaler  Commonly known as: PROVENTIL HFA;VENTOLIN HFA;PROAIR HFA  Inhale 2 puffs Every 6 (Six) Hours As Needed for Wheezing.           Where to Get Your Medications      These medications were sent to [facility] [address] [place] [address] - [facility] [address] - [phone]  - [phone]   [address], [facility] [address]    Phone: [phone]   · albuterol sulfate  (90 Base) MCG/ACT inhaler          Dipak Roberts MD  04/22/22 0012

## 2022-04-22 NOTE — PROGRESS NOTES
"[facility]  Heart [facility] Val[facility]    Chief Complaint  Chest Pain and Establish Care    Subjective    History of Present Illness {CC  Problem List  Visit  Diagnosis   Encounters  Notes  Medications  Labs  Result Review Imaging  Media :23}     Diana Fulton is a 56 y.o. female with anxiety/depression who presents today for evaluation of chest pain at the request with Dr. Roberts. Patient presented to the ED 4/21 with a one week hx of intermittent chest tightness and associated dyspnea. Denies exertional chest pain.  Describes it as a constant \"vice\" around her chest. She notes associated \"heart pounding\". She admits to increased stress. Worse around bedtime. She says she couldn't sleep because her heart is pounding. Feels like she cannot \"get enough oxygen in her lungs\" but denies exertional dyspnea. She notes chronic intermittent palpitations and has worn heart monitors in the past. Has never had a stress test.  Denies feeling of an irregular heartbeat.  She reports she recent was diagnosed with breast cancer and has a lumpectomy scheduled for Friday.  She will also require radiation.  She reports the thought of this is causing her a significant amount of anxiety       Cardiac risks: age, obesity    Objective     Vital Signs:   Vitals:    04/25/22 0756 04/25/22 0757 04/25/22 0758   BP: 143/83 143/79 130/74   BP Location: Right arm Left arm Left arm   Patient Position: Sitting Standing Sitting   Cuff Size: Adult Adult Adult   Pulse: 81 89 83   Resp:   16   Temp: 97.2 °F (36.2 °C) 97.3 °F (36.3 °C) 97.3 °F (36.3 °C)   TempSrc: Temporal Temporal Temporal   SpO2: 100% 99% 99%   Weight:   94.9 kg (209 lb 3 oz)   Height:   165.1 cm (65\")     Body mass index is 34.81 kg/m².  Physical Exam  Vitals reviewed.   Constitutional:       Appearance: Normal appearance.   HENT:      Head: Normocephalic.   Neck:      Vascular: No carotid bruit.   Cardiovascular:      Rate and Rhythm: Normal rate and " regular rhythm.      Pulses: Normal pulses.      Heart sounds: Normal heart sounds, S1 normal and S2 normal. No murmur heard.  Pulmonary:      Effort: Pulmonary effort is normal. No respiratory distress.      Breath sounds: Normal breath sounds.   Chest:      Chest wall: No tenderness.   Abdominal:      General: Abdomen is flat.      Palpations: Abdomen is soft.   Musculoskeletal:      Cervical back: Neck supple.      Right lower leg: No edema.      Left lower leg: No edema.   Skin:     General: Skin is warm and dry.   Neurological:      General: No focal deficit present.      Mental Status: She is alert and oriented to person, place, and time. Mental status is at baseline.   Psychiatric:         Mood and Affect: Mood normal.         Behavior: Behavior normal.         Thought Content: Thought content normal.              Result Review  Data Reviewed:{ Labs  Result Review  Imaging  Med Tab  Media :23}   ECG 12 Lead (04/21/2022 18:33)  ECG 12 Lead (04/21/2022 16:18)  Troponin (04/21/2022 18:36)  BNP (04/21/2022 16:28)  Lipase (04/21/2022 16:28)  Comprehensive Metabolic Panel (04/21/2022 16:28)  CBC & Differential (04/21/2022 16:28)  Troponin (04/21/2022 16:28)    Consultant notes Dr. Roberts            Assessment and Plan {CC Problem List  Visit Diagnosis  ROS  Review (Popup)  Health Maintenance  Quality  BestPractice  Medications  SmartSets  SnapShot Encounters  Media :23}   1. Chest pain, unspecified type  Overall low ASCVD risk.  Symptoms very well may be related to anxiety.  We will do a GXT for further cardiac restratification  - Treadmill Stress Test; Future  - Adult Transthoracic Echo Complete W/ Cont if Necessary Per Protocol; Future    2. Palpitations  She describes as a regular heart pounding that occurs when she is trying to go to sleep.  Likely related to anxiety.  We discussed possibly doing a heart monitor, but we have deferred due to her upcoming surgery and likelihood that it is related  to anxiety.  I did prescribe her propranolol 10 mg as needed to help night time heart pounding/anxiety. Advised her to call if symptoms persist  - Treadmill Stress Test; Future  - Adult Transthoracic Echo Complete W/ Cont if Necessary Per Protocol; Future    3. Shortness of breath    - Treadmill Stress Test; Future  - Adult Transthoracic Echo Complete W/ Cont if Necessary Per Protocol; Future    4. Elevated blood pressure without HTN  She reports that her blood pressure is typically low.  This is likely secondary to anxiety and stress. Only mildly elevated today.Advised to monitor at home, she does have a blood pressure cuff    Follow Up {Instructions Charge Capture  Follow-up Communications :23}   Return if symptoms worsen or fail to improve.    Patient was given instructions and counseling regarding her condition or for health maintenance advice. Please see specific information pulled into the AVS if appropriate.  Advised to call the Heart and Valve Center with any questions, concerns, or worsening symptoms.

## 2022-04-25 ENCOUNTER — OFFICE VISIT (OUTPATIENT)
Dept: CARDIOLOGY | Facility: HOSPITAL | Age: 57
End: 2022-04-25

## 2022-04-25 ENCOUNTER — PATIENT ROUNDING (BHMG ONLY) (OUTPATIENT)
Dept: CARDIOLOGY | Facility: HOSPITAL | Age: 57
End: 2022-04-25

## 2022-04-25 VITALS
TEMPERATURE: 97.3 F | OXYGEN SATURATION: 99 % | HEART RATE: 83 BPM | RESPIRATION RATE: 16 BRPM | DIASTOLIC BLOOD PRESSURE: 74 MMHG | WEIGHT: 209.19 LBS | BODY MASS INDEX: 34.85 KG/M2 | SYSTOLIC BLOOD PRESSURE: 130 MMHG | HEIGHT: 65 IN

## 2022-04-25 DIAGNOSIS — R00.2 PALPITATIONS: ICD-10-CM

## 2022-04-25 DIAGNOSIS — R06.02 SHORTNESS OF BREATH: ICD-10-CM

## 2022-04-25 DIAGNOSIS — R03.0 ELEVATED BLOOD-PRESSURE READING, WITHOUT DIAGNOSIS OF HYPERTENSION: ICD-10-CM

## 2022-04-25 DIAGNOSIS — R07.9 CHEST PAIN, UNSPECIFIED TYPE: Primary | ICD-10-CM

## 2022-04-25 PROCEDURE — 99214 OFFICE O/P EST MOD 30 MIN: CPT | Performed by: NURSE PRACTITIONER

## 2022-04-25 RX ORDER — LORATADINE 10 MG/1
10 TABLET ORAL DAILY
COMMUNITY

## 2022-04-25 RX ORDER — MELATONIN
1000 DAILY
COMMUNITY

## 2022-04-25 RX ORDER — CHOLECALCIFEROL (VITAMIN D3) 125 MCG
500 CAPSULE ORAL DAILY
COMMUNITY

## 2022-04-25 RX ORDER — PROPRANOLOL HYDROCHLORIDE 10 MG/1
10 TABLET ORAL 3 TIMES DAILY PRN
Qty: 60 TABLET | Refills: 0 | Status: SHIPPED | OUTPATIENT
Start: 2022-04-25

## 2022-04-25 NOTE — PROGRESS NOTES
April 25, 2022    Hello, may I speak with Diana Fulton?    My name is Leigh Ann      I am  with [facility]  [address]  [facility] [address] [phone].    Before we get started may I verify your date of birth? 1965    I am calling to officially welcome you to our practice and ask about your recent visit. Is this a good time to talk? Pt unavailable     Tell me about your visit with us. What things went well?  Pt unavailable        We're always looking for ways to make our patients' experiences even better. Do you have recommendations on ways we may improve?  pt unavailable  Overall were you satisfied with your first visit to our practice?pt unavailable       I appreciate you taking the time to speak with me today. Is there anything else I can do for you? Pt unavailable      Thank you, and have a great day.

## 2022-04-27 ENCOUNTER — HOSPITAL ENCOUNTER (OUTPATIENT)
Dept: CARDIOLOGY | Facility: HOSPITAL | Age: 57
Discharge: HOME | End: 2022-04-27

## 2022-04-27 VITALS — BODY MASS INDEX: 34.82 KG/M2 | HEIGHT: 65 IN | WEIGHT: 209 LBS

## 2022-04-27 DIAGNOSIS — R07.9 CHEST PAIN, UNSPECIFIED TYPE: ICD-10-CM

## 2022-04-27 DIAGNOSIS — R00.2 PALPITATIONS: ICD-10-CM

## 2022-04-27 DIAGNOSIS — R06.02 SHORTNESS OF BREATH: ICD-10-CM

## 2022-04-27 LAB
BH CV ECHO MEAS - AO MAX PG: 8.1 MMHG
BH CV ECHO MEAS - AO MEAN PG: 4.5 MMHG
BH CV ECHO MEAS - AO ROOT DIAM: 3.9 CM
BH CV ECHO MEAS - AO V2 MAX: 142.5 CM/SEC
BH CV ECHO MEAS - AO V2 VTI: 29.8 CM
BH CV ECHO MEAS - AVA(I,D): 2.02 CM2
BH CV ECHO MEAS - EDV(CUBED): 140.1 ML
BH CV ECHO MEAS - EDV(MOD-SP2): 71.5 ML
BH CV ECHO MEAS - EDV(MOD-SP4): 125 ML
BH CV ECHO MEAS - EF(MOD-BP): 54.9 %
BH CV ECHO MEAS - EF(MOD-SP2): 52.7 %
BH CV ECHO MEAS - EF(MOD-SP4): 57.1 %
BH CV ECHO MEAS - ESV(CUBED): 59.2 ML
BH CV ECHO MEAS - ESV(MOD-SP2): 33.8 ML
BH CV ECHO MEAS - ESV(MOD-SP4): 53.6 ML
BH CV ECHO MEAS - FS: 25 %
BH CV ECHO MEAS - IVS/LVPW: 1.12 CM
BH CV ECHO MEAS - IVSD: 0.97 CM
BH CV ECHO MEAS - LA DIMENSION: 3.3 CM
BH CV ECHO MEAS - LAT PEAK E' VEL: 13.6 CM/SEC
BH CV ECHO MEAS - LV DIASTOLIC VOL/BSA (35-75): 62 CM2
BH CV ECHO MEAS - LV MASS(C)D: 173.4 GRAMS
BH CV ECHO MEAS - LV MAX PG: 3.5 MMHG
BH CV ECHO MEAS - LV MEAN PG: 2.06 MMHG
BH CV ECHO MEAS - LV SYSTOLIC VOL/BSA (12-30): 26.6 CM2
BH CV ECHO MEAS - LV V1 MAX: 93.4 CM/SEC
BH CV ECHO MEAS - LV V1 VTI: 19.6 CM
BH CV ECHO MEAS - LVIDD: 5.2 CM
BH CV ECHO MEAS - LVIDS: 3.9 CM
BH CV ECHO MEAS - LVOT AREA: 3.1 CM2
BH CV ECHO MEAS - LVOT DIAM: 1.97 CM
BH CV ECHO MEAS - LVPWD: 0.87 CM
BH CV ECHO MEAS - MED PEAK E' VEL: 7.6 CM/SEC
BH CV ECHO MEAS - MV A MAX VEL: 76.7 CM/SEC
BH CV ECHO MEAS - MV DEC SLOPE: 486.9 CM/SEC2
BH CV ECHO MEAS - MV DEC TIME: 0.15 MSEC
BH CV ECHO MEAS - MV E MAX VEL: 70.7 CM/SEC
BH CV ECHO MEAS - MV E/A: 0.92
BH CV ECHO MEAS - MV MAX PG: 3.2 MMHG
BH CV ECHO MEAS - MV MEAN PG: 1.74 MMHG
BH CV ECHO MEAS - MV V2 VTI: 23.4 CM
BH CV ECHO MEAS - MVA(VTI): 2.6 CM2
BH CV ECHO MEAS - PA ACC TIME: 0.11 SEC
BH CV ECHO MEAS - PA PR(ACCEL): 28.3 MMHG
BH CV ECHO MEAS - RAP SYSTOLE: 3 MMHG
BH CV ECHO MEAS - RVSP: 23 MMHG
BH CV ECHO MEAS - SI(MOD-SP2): 18.7 ML/M2
BH CV ECHO MEAS - SI(MOD-SP4): 35.4 ML/M2
BH CV ECHO MEAS - SV(LVOT): 60 ML
BH CV ECHO MEAS - SV(MOD-SP2): 37.7 ML
BH CV ECHO MEAS - SV(MOD-SP4): 71.4 ML
BH CV ECHO MEAS - TAPSE (>1.6): 2.07 CM
BH CV ECHO MEAS - TR MAX PG: 20.2 MMHG
BH CV ECHO MEAS - TR MAX VEL: 224 CM/SEC
BH CV ECHO MEASUREMENTS AVERAGE E/E' RATIO: 6.67
BH CV STRESS BP STAGE 1: NORMAL
BH CV STRESS BP STAGE 2: NORMAL
BH CV STRESS DURATION MIN STAGE 1: 3
BH CV STRESS DURATION MIN STAGE 2: 3
BH CV STRESS DURATION SEC STAGE 1: 0
BH CV STRESS DURATION SEC STAGE 2: 0
BH CV STRESS GRADE STAGE 1: 10
BH CV STRESS GRADE STAGE 2: 12
BH CV STRESS HR STAGE 1: 146
BH CV STRESS HR STAGE 2: 166
BH CV STRESS METS STAGE 1: 5
BH CV STRESS METS STAGE 2: 7.5
BH CV STRESS O2 STAGE 1: 100
BH CV STRESS O2 STAGE 2: 100
BH CV STRESS PROTOCOL 1: NORMAL
BH CV STRESS RECOVERY BP: NORMAL MMHG
BH CV STRESS RECOVERY HR: 104 BPM
BH CV STRESS RECOVERY O2: 99 %
BH CV STRESS SPEED STAGE 1: 1.7
BH CV STRESS SPEED STAGE 2: 2.5
BH CV STRESS STAGE 1: 1
BH CV STRESS STAGE 2: 2
BH CV VAS BP RIGHT ARM: NORMAL MMHG
BH CV XLRA - RV BASE: 4 CM
BH CV XLRA - RV LENGTH: 8.5 CM
BH CV XLRA - RV MID: 2.7 CM
BH CV XLRA - TDI S': 9.4 CM/SEC
IVRT: 109 MSEC
LEFT ATRIUM VOLUME INDEX: 9.8 ML/M2
MAXIMAL PREDICTED HEART RATE: 166 BPM
MAXIMAL PREDICTED HEART RATE: 166 BPM
PERCENT MAX PREDICTED HR: 101.81 %
STRESS BASELINE BP: NORMAL MMHG
STRESS BASELINE HR: 106 BPM
STRESS O2 SAT REST: 100 %
STRESS PERCENT HR: 120 %
STRESS POST ESTIMATED WORKLOAD: 7 METS
STRESS POST EXERCISE DUR MIN: 6 MIN
STRESS POST EXERCISE DUR SEC: 1 SEC
STRESS POST O2 SAT PEAK: 100 %
STRESS POST PEAK BP: NORMAL MMHG
STRESS POST PEAK HR: 169 BPM
STRESS TARGET HR: 141 BPM
STRESS TARGET HR: 141 BPM

## 2022-04-27 PROCEDURE — 93306 TTE W/DOPPLER COMPLETE: CPT | Performed by: INTERNAL MEDICINE

## 2022-04-27 PROCEDURE — 93018 CV STRESS TEST I&R ONLY: CPT | Performed by: INTERNAL MEDICINE

## 2022-04-27 PROCEDURE — 93306 TTE W/DOPPLER COMPLETE: CPT

## 2022-04-27 PROCEDURE — 93017 CV STRESS TEST TRACING ONLY: CPT

## 2022-04-28 ENCOUNTER — TELEPHONE (OUTPATIENT)
Dept: CARDIOLOGY | Facility: HOSPITAL | Age: 57
End: 2022-04-28

## 2022-04-28 NOTE — TELEPHONE ENCOUNTER
Called patient with stress test and echo results. Both are within acceptable limits. She reports that she is feeling better just nervous about her upcoming lumpectomy for newly diagnosed breast cancer. Advised her that I would send stress test and echo results to Dr. Lloyd's office. Overall patient is low cardiac risk for noncardiac surgery and should be able to proceed without any further cardiac testing.  I advised her to call me if she has any recurrent symptoms

## 2022-05-11 RX ORDER — PROPRANOLOL HYDROCHLORIDE 10 MG/1
TABLET ORAL
Qty: 60 TABLET | Refills: 0 | OUTPATIENT
Start: 2022-05-11

## 2023-04-20 ENCOUNTER — OFFICE VISIT (OUTPATIENT)
Dept: FAMILY MEDICINE CLINIC | Facility: CLINIC | Age: 58
End: 2023-04-20
Payer: COMMERCIAL

## 2023-04-20 VITALS
OXYGEN SATURATION: 98 % | WEIGHT: 216 LBS | HEIGHT: 65 IN | HEART RATE: 75 BPM | BODY MASS INDEX: 35.99 KG/M2 | SYSTOLIC BLOOD PRESSURE: 130 MMHG | DIASTOLIC BLOOD PRESSURE: 80 MMHG

## 2023-04-20 DIAGNOSIS — G89.29 CHRONIC PAIN OF LEFT KNEE: ICD-10-CM

## 2023-04-20 DIAGNOSIS — Z13.220 LIPID SCREENING: ICD-10-CM

## 2023-04-20 DIAGNOSIS — D17.21 LIPOMA OF RIGHT UPPER EXTREMITY: ICD-10-CM

## 2023-04-20 DIAGNOSIS — Z11.59 ENCOUNTER FOR HEPATITIS C SCREENING TEST FOR LOW RISK PATIENT: ICD-10-CM

## 2023-04-20 DIAGNOSIS — Z00.00 ROUTINE PHYSICAL EXAMINATION: Primary | ICD-10-CM

## 2023-04-20 DIAGNOSIS — Z13.29 THYROID DISORDER SCREENING: ICD-10-CM

## 2023-04-20 DIAGNOSIS — M25.562 CHRONIC PAIN OF LEFT KNEE: ICD-10-CM

## 2023-04-20 NOTE — PROGRESS NOTES
"Chief Complaint  Annual Exam    Subjective          Diana Fulton presents to [facility]  History of Present Illness  Patient in today for routine physical exam.  She states overall has been feeling well.  She has had some issues with chronic left posterior knee pain and has felt some swelling to knee at times as well.  Denies any known injury to area.  She also states has been bothered by numerous skin lipomas but for the past year feels like the ones to her right forearm have increased in size and are causing some discomfort so is interested in getting in with general surgeon to discuss.  She states she is currently up-to-date on her mammogram and GYN exam.  She is up-to-date on her colon cancer screening.  She is up-to-date on her eye and dental exam.  Denies any changes to bowel or urine habits.  Knee Pain         Objective   Vital Signs:   /80 (BP Location: Right arm, Patient Position: Sitting, Cuff Size: Large Adult)   Pulse 75   Ht 165.1 cm (65\")   Wt 98 kg (216 lb)   SpO2 98%   BMI 35.94 kg/m²     Body mass index is 35.94 kg/m².    Review of Systems   Constitutional: Negative for fever.   HENT: Negative for congestion and sore throat.    Respiratory: Negative for cough and shortness of breath.    Cardiovascular: Negative for chest pain.   Gastrointestinal: Negative for abdominal pain, diarrhea, nausea and vomiting.   Genitourinary: Negative for dysuria and frequency.   Musculoskeletal: Positive for arthralgias and joint swelling.   Skin:        Lipoma to right forearm   Neurological: Negative for dizziness and headache.       Past History:  Medical History: has a past medical history of Anxiety, Bulging lumbar disc, Depression, Joint pain, and Rash.   Surgical History: has a past surgical history that includes Cholecystectomy;  section; Hysterectomy; and Tonsillectomy.   Family History: family history includes ALS in her mother; Cancer in her maternal " aunt, maternal aunt, maternal grandmother, and paternal grandfather; Diabetes type II in her father; Heart attack in her maternal grandmother; Heart disease in her maternal uncle; Hypertension in her father; No Known Problems in her brother, maternal grandfather, paternal grandmother, and sister.   Social History: reports that she quit smoking about 15 years ago. Her smoking use included cigarettes. She has never used smokeless tobacco. She reports current alcohol use. She reports that she does not use drugs.      Current Outpatient Medications:   •  albuterol sulfate  (90 Base) MCG/ACT inhaler, Inhale 2 puffs Every 6 (Six) Hours As Needed for Wheezing., Disp: 18 g, Rfl: 0  •  cetirizine (zyrTEC) 10 MG tablet, Take 1 tablet by mouth Daily., Disp: , Rfl:   •  cholecalciferol (VITAMIN D3) 25 MCG (1000 UT) tablet, Take 1 tablet by mouth Daily., Disp: , Rfl:   •  loratadine (CLARITIN) 10 MG tablet, Take 1 tablet by mouth Daily., Disp: , Rfl:   •  Multiple Vitamins-Minerals (MULTIVITAMIN ADULT PO), Take  by mouth., Disp: , Rfl:   •  vitamin B-12 (CYANOCOBALAMIN) 500 MCG tablet, Take 1 tablet by mouth Daily., Disp: , Rfl:   Allergies: Adhesive tape and Latex    Physical Exam  Constitutional:       Appearance: Normal appearance.   HENT:      Right Ear: Tympanic membrane normal.      Left Ear: Tympanic membrane normal.      Nose: Nose normal.      Mouth/Throat:      Mouth: Mucous membranes are moist.   Eyes:      Pupils: Pupils are equal, round, and reactive to light.   Cardiovascular:      Rate and Rhythm: Normal rate and regular rhythm.   Pulmonary:      Effort: Pulmonary effort is normal.      Breath sounds: Normal breath sounds.   Abdominal:      Palpations: Abdomen is soft.   Neurological:      Mental Status: She is alert and oriented to person, place, and time.   Psychiatric:         Mood and Affect: Mood normal.         Behavior: Behavior normal.             Assessment and Plan   Diagnoses and all orders for  this visit:    1. Routine physical examination (Primary)  -     CBC & Differential; Future  -     Comprehensive Metabolic Panel; Future  Encouraged healthy diet and exercise. Will rtc for fasting labs.   2. Chronic pain of left knee  -     XR Knee 1 or 2 View Left (In Office)  Will check xray of knee today. Discussed if negative may consider to check venous doppler to r/o baker's cyst.   3. Lipoma of right upper extremity  -     Ambulatory Referral to General Surgery  Will set up with gen surgery for further evaluation.   4. Encounter for hepatitis C screening test for low risk patient  -     Hepatitis C Antibody; Future    5. Lipid screening  -     Lipid Panel; Future    6. Thyroid disorder screening  -     TSH; Future            Follow Up   No follow-ups on file.  Patient was given instructions and counseling regarding her condition or for health maintenance advice. Please see specific information pulled into the AVS if appropriate.     TEE GarzaC

## 2023-04-25 DIAGNOSIS — G89.29 CHRONIC PAIN OF LEFT KNEE: Primary | ICD-10-CM

## 2023-04-25 DIAGNOSIS — M25.562 CHRONIC PAIN OF LEFT KNEE: Primary | ICD-10-CM

## 2023-05-04 ENCOUNTER — LAB (OUTPATIENT)
Dept: FAMILY MEDICINE CLINIC | Facility: CLINIC | Age: 58
End: 2023-05-04
Payer: COMMERCIAL

## 2023-05-04 DIAGNOSIS — Z13.29 THYROID DISORDER SCREENING: ICD-10-CM

## 2023-05-04 DIAGNOSIS — Z00.00 ROUTINE PHYSICAL EXAMINATION: ICD-10-CM

## 2023-05-04 DIAGNOSIS — Z13.220 LIPID SCREENING: ICD-10-CM

## 2023-05-04 DIAGNOSIS — Z11.59 ENCOUNTER FOR HEPATITIS C SCREENING TEST FOR LOW RISK PATIENT: ICD-10-CM

## 2023-05-04 PROCEDURE — 36415 COLL VENOUS BLD VENIPUNCTURE: CPT | Performed by: PHYSICIAN ASSISTANT

## 2023-05-05 LAB
ALBUMIN SERPL-MCNC: 4.4 G/DL (ref 3.8–4.9)
ALBUMIN/GLOB SERPL: 2 {RATIO} (ref 1.2–2.2)
ALP SERPL-CCNC: 57 IU/L (ref 44–121)
ALT SERPL-CCNC: 15 IU/L (ref 0–32)
AST SERPL-CCNC: 21 IU/L (ref 0–40)
BASOPHILS # BLD AUTO: 0.1 X10E3/UL (ref 0–0.2)
BASOPHILS NFR BLD AUTO: 1 %
BILIRUB SERPL-MCNC: 0.5 MG/DL (ref 0–1.2)
BUN SERPL-MCNC: 16 MG/DL (ref 6–24)
BUN/CREAT SERPL: 25 (ref 9–23)
CALCIUM SERPL-MCNC: 9.6 MG/DL (ref 8.7–10.2)
CHLORIDE SERPL-SCNC: 104 MMOL/L (ref 96–106)
CHOLEST SERPL-MCNC: 193 MG/DL (ref 100–199)
CO2 SERPL-SCNC: 26 MMOL/L (ref 20–29)
CREAT SERPL-MCNC: 0.65 MG/DL (ref 0.57–1)
EGFRCR SERPLBLD CKD-EPI 2021: 104 ML/MIN/1.73
EOSINOPHIL # BLD AUTO: 0.2 X10E3/UL (ref 0–0.4)
EOSINOPHIL NFR BLD AUTO: 5 %
ERYTHROCYTE [DISTWIDTH] IN BLOOD BY AUTOMATED COUNT: 12.6 % (ref 11.7–15.4)
GLOBULIN SER CALC-MCNC: 2.2 G/DL (ref 1.5–4.5)
GLUCOSE SERPL-MCNC: 90 MG/DL (ref 70–99)
HCT VFR BLD AUTO: 40.3 % (ref 34–46.6)
HDLC SERPL-MCNC: 56 MG/DL
HGB BLD-MCNC: 13.1 G/DL (ref 11.1–15.9)
IMM GRANULOCYTES # BLD AUTO: 0 X10E3/UL (ref 0–0.1)
IMM GRANULOCYTES NFR BLD AUTO: 0 %
LDLC SERPL CALC-MCNC: 115 MG/DL (ref 0–99)
LYMPHOCYTES # BLD AUTO: 1.6 X10E3/UL (ref 0.7–3.1)
LYMPHOCYTES NFR BLD AUTO: 30 %
MCH RBC QN AUTO: 29.4 PG (ref 26.6–33)
MCHC RBC AUTO-ENTMCNC: 32.5 G/DL (ref 31.5–35.7)
MCV RBC AUTO: 91 FL (ref 79–97)
MONOCYTES # BLD AUTO: 0.5 X10E3/UL (ref 0.1–0.9)
MONOCYTES NFR BLD AUTO: 10 %
NEUTROPHILS # BLD AUTO: 2.9 X10E3/UL (ref 1.4–7)
NEUTROPHILS NFR BLD AUTO: 54 %
PLATELET # BLD AUTO: 279 X10E3/UL (ref 150–450)
POTASSIUM SERPL-SCNC: 4.9 MMOL/L (ref 3.5–5.2)
PROT SERPL-MCNC: 6.6 G/DL (ref 6–8.5)
RBC # BLD AUTO: 4.45 X10E6/UL (ref 3.77–5.28)
SODIUM SERPL-SCNC: 142 MMOL/L (ref 134–144)
TRIGL SERPL-MCNC: 122 MG/DL (ref 0–149)
TSH SERPL DL<=0.005 MIU/L-ACNC: 1.16 UIU/ML (ref 0.45–4.5)
VLDLC SERPL CALC-MCNC: 22 MG/DL (ref 5–40)
WBC # BLD AUTO: 5.3 X10E3/UL (ref 3.4–10.8)

## 2023-05-23 ENCOUNTER — OFFICE VISIT (OUTPATIENT)
Dept: ORTHOPEDIC SURGERY | Facility: CLINIC | Age: 58
End: 2023-05-23
Payer: COMMERCIAL

## 2023-05-23 VITALS
SYSTOLIC BLOOD PRESSURE: 121 MMHG | DIASTOLIC BLOOD PRESSURE: 77 MMHG | WEIGHT: 208.9 LBS | HEIGHT: 65 IN | BODY MASS INDEX: 34.81 KG/M2

## 2023-05-23 DIAGNOSIS — M25.562 LEFT KNEE PAIN, UNSPECIFIED CHRONICITY: Primary | ICD-10-CM

## 2023-05-23 DIAGNOSIS — M17.12 PRIMARY OSTEOARTHRITIS OF LEFT KNEE: ICD-10-CM

## 2023-05-23 DIAGNOSIS — S83.242A ACUTE MEDIAL MENISCUS TEAR OF LEFT KNEE, INITIAL ENCOUNTER: ICD-10-CM

## 2023-05-23 RX ORDER — MELOXICAM 15 MG/1
15 TABLET ORAL DAILY
Qty: 30 TABLET | Refills: 1 | Status: SHIPPED | OUTPATIENT
Start: 2023-05-23

## 2023-05-23 RX ORDER — NAPROXEN SODIUM 220 MG
220 TABLET ORAL 2 TIMES DAILY PRN
COMMUNITY

## 2023-05-23 NOTE — PROGRESS NOTES
[facility] [place] [facility] Office Visit     Office Visit       Date: 05/23/2023   Patient Name: Diana Fulton  MRN: [id number]  YOB: 1965    Referring Physician: Olivia Byrnes PA-C     Chief Complaint:   Chief Complaint   Patient presents with   • Left Knee - Pain, Initial Evaluation       History of Present Illness:   Diana Fulton is a 58 y.o. female who presents with left knee pain for 6 month(s). Onset atraumatic and gradual in nature. Pain is localized to the medial joint line, popliteal region and is a 2/10 on the pain scale. Pain is described as Pain. Associated symptoms include pain, swelling, popping and stiffness. The pain is worse with walking, standing, sitting, sleeping and rising from seated position; resting and elevating the extremity make it better. Previous treatments have included: NSAIDS since symptom onset. Although some transient relief was reported with these interventions, these conservative measures have failed and symptoms have persisted. The patient is limited in daily activities and has had a significant decrease in quality of life as a result. She denies fevers, chills, or constitutional symptoms. Patient is a former smoker (Quit 8/26/07).    Subjective   Review of Systems: Review of Systems   Constitutional: Negative.  Negative for chills, fever, unexpected weight gain and unexpected weight loss.   HENT: Negative.  Negative for congestion, postnasal drip and rhinorrhea.    Eyes: Negative.  Negative for blurred vision.   Respiratory: Negative.  Negative for shortness of breath.    Cardiovascular: Negative.  Negative for leg swelling.   Gastrointestinal: Negative.  Negative for abdominal pain, nausea and vomiting.   Endocrine: Negative.    Genitourinary: Negative.  Negative for difficulty urinating.   Musculoskeletal: Positive for arthralgias. Negative for gait problem, joint swelling and myalgias.   Skin: Negative for skin  lesions and wound.   Neurological: Negative for dizziness, weakness, light-headedness and numbness.   Hematological: Negative.  Does not bruise/bleed easily.   Psychiatric/Behavioral: Negative.  Negative for depressed mood.   All other systems reviewed and are negative.       I have reviewed the following portions of the patient's history:History of Present Illness and review of systems.    Past Medical History:   Past Medical History:   Diagnosis Date   • Anxiety    • Bulging lumbar disc    • Depression    • Joint pain     elbows and knees    • Rash        Past Surgical History:   Past Surgical History:   Procedure Laterality Date   •  SECTION     • CHOLECYSTECTOMY     • HYSTERECTOMY     • TONSILLECTOMY         Family History:   Family History   Problem Relation Age of Onset   • ALS Mother    • Hypertension Father    • Diabetes type II Father    • No Known Problems Sister    • No Known Problems Brother    • Cancer Maternal Aunt         BREAST   • Cancer Maternal Aunt         KIDNEY   • Heart disease Maternal Uncle    • Heart attack Maternal Grandmother    • Cancer Maternal Grandmother         COLON   • No Known Problems Maternal Grandfather    • No Known Problems Paternal Grandmother    • Cancer Paternal Grandfather         PANCREATIC       Social History:   Social History     Socioeconomic History   • Marital status: Single   Tobacco Use   • Smoking status: Former     Types: Cigarettes     Quit date: 2007     Years since quitting: 15.7   • Smokeless tobacco: Never   Substance and Sexual Activity   • Alcohol use: Yes     Comment: moderate, 1 EVRY OTHER WEEK   • Drug use: No   • Sexual activity: Defer       Medications:   Current Outpatient Medications:   •  albuterol sulfate  (90 Base) MCG/ACT inhaler, Inhale 2 puffs Every 6 (Six) Hours As Needed for Wheezing., Disp: 18 g, Rfl: 0  •  cetirizine (zyrTEC) 10 MG tablet, Take 1 tablet by mouth Daily., Disp: , Rfl:   •  cholecalciferol (VITAMIN D3)  "25 MCG (1000 UT) tablet, Take 1 tablet by mouth Daily., Disp: , Rfl:   •  loratadine (CLARITIN) 10 MG tablet, Take 1 tablet by mouth Daily., Disp: , Rfl:   •  Multiple Vitamins-Minerals (MULTIVITAMIN ADULT PO), Take  by mouth., Disp: , Rfl:   •  naproxen sodium (ALEVE) 220 MG tablet, Take 1 tablet by mouth 2 (Two) Times a Day As Needed., Disp: , Rfl:   •  vitamin B-12 (CYANOCOBALAMIN) 500 MCG tablet, Take 1 tablet by mouth Daily., Disp: , Rfl:   •  meloxicam (MOBIC) 15 MG tablet, Take 1 tablet by mouth Daily., Disp: 30 tablet, Rfl: 1    Allergies:   Allergies   Allergen Reactions   • Other Unknown - High Severity   • Adhesive Tape Dermatitis   • Latex Rash     Rash at site of contact       I reviewed the patient's chief complaint, history of present illness, review of systems, past medical history, surgical history, family history, social history, medications and allergy list.     Objective    Vital Signs:   Vitals:    05/23/23 0812   BP: 121/77   Weight: 94.8 kg (208 lb 14.4 oz)   Height: 165.1 cm (65\")     Body mass index is 34.76 kg/m².   Class 2 Severe Obesity (BMI >=35 and <=39.9). Obesity-related health conditions include the following: hypertension, coronary heart disease, diabetes mellitus and dyslipidemias. Obesity is newly identified. BMI is is above average; BMI management plan is completed. We discussed portion control and increasing exercise.     Patient reports that she is a former smoker. She quit smoking in 2007.  He has not resumed smoking since that time.  This behavior was applauded and she was encouraged to continue in smoking cessation.  We will continue to monitor at subsequent visits.    Ortho Exam:  Constitutional: General Appearance: healthy-appearing, NAD, and normal body habitus.   Gait and Station: Appearance: limp and antalgic gait and ambulates with no assistive devices.   Skin: Right Lower Extremity: normal. Left Lower Extremity: normal.   Psychiatric: Orientation: oriented to time, " place, and person. Mood and Affect: normal mood and affect and active and alert.   Left knee exam:   There is swelling and effusion but no warmth or erythema of the knee.    The skin is clear.    Overall the alignment of the knee is varus   The patient has 5/5 strength with ankle plantar flexion, dorsiflexion, inversion, eversion, and great toe extension.    Sensation is grossly present to light touch in the superficial peroneal, deep peroneal, and tibial nerve distributions.    The dorsalis pedis pulse is 2+ and the foot is well perfused with brisk capillary refill.   Active ROM is 0° to 110°.    The knee shows no gross instability to varus/valgus stress testing, anterior/posterior drawer sign, and Lachman testing.    There is tenderness to palpation over the medial joint line. Patellar grind test is positive.   Hip ROM is full and painless.    Results Review:   Imaging Results (Last 24 Hours)     Procedure Component Value Units Date/Time    XR Knee 4+ View Left [[id number]] Collected: 05/23/23 0814     Updated: 05/23/23 0821    Narrative:      XR KNEE 4+ VW LEFT    Date of Exam: 5/23/2023 7:56 AM EDT    Indication: PAIN    Comparison: 4/20/2023    Findings:  Small knee joint effusion. Articular surfaces appear intact. Mild degenerative changes in the patellofemoral compartment. Subchondral lucency along the lateral trochlea may represent cyst no evidence of acute fracture..      Impression:      Impression:  No evidence of acute fracture or malalignment.    Small knee joint effusion.    Mild patellofemoral degenerative changes.      Electronically Signed: Gabriele Minor    5/23/2023 8:18 AM EDT    Workstation ID: [id number]      Indication: Left knee pain    Views: Weightbearing views of the knee are submitted.     Impression: There is no fracture subluxation or dislocation. The patella sits normally in the trochlea. There are no acute findings. There is mild medial joint space narrowing with osteophyte  formation.    Comparison: Unchanged compared to similar images from 4/20/2023.    Procedures     Assessment / Plan    Assessment/Plan:   Diagnoses and all orders for this visit:    1. Left knee pain, unspecified chronicity (Primary)  -     XR Knee 4+ View Left    2. Primary osteoarthritis of left knee  -     meloxicam (MOBIC) 15 MG tablet; Take 1 tablet by mouth Daily.  Dispense: 30 tablet; Refill: 1  -     Ambulatory Referral to Physical Therapy Evaluate and treat    3. Acute medial meniscus tear of left knee, initial encounter  -     Ambulatory Referral to Physical Therapy Evaluate and treat      6+ months of acute on chronic left knee pain.  She has a history of medial meniscus tear approximately 5 years ago.  However, symptoms were controlled with conservative treatment.  She has no acute mechanism of injury to explain symptoms now.  Tender most along the medial joint line but will occasionally get both posterior and lateral knee pain.  Radiographs obtained today show mild medial joint space narrowing.  I discussed her radiographic findings as well as her findings on exam.  She has intact range of motion and strength.  I discussed a conservative treatment model for her knee arthritis as well as the medial meniscus tear.  Given her current status, I will start her on an anti-inflammatory medication with meloxicam.  I will get her into physical therapy to work on hip core and quadricep strengthening to offload the knee.  I will see her back in 6 weeks to monitor response and decide on additional treatment options.    Previous documentation reviewed: 4/20/2023-CHANDLER Menjivar-office visit.    Previous laboratory results reviewed: 5/4/2023-creatinine 0.65, EGFR 104.    Previous imaging studies reviewed: 4/20/2023-radiographs of the left knee.    Follow Up:   Return in about 6 weeks (around 7/4/2023) for Recheck.      Travis Berg MD  [facility] [place] [facility]

## 2023-11-06 DIAGNOSIS — M17.12 PRIMARY OSTEOARTHRITIS OF LEFT KNEE: ICD-10-CM

## 2023-11-06 RX ORDER — MELOXICAM 15 MG/1
15 TABLET ORAL DAILY
Qty: 30 TABLET | Refills: 1 | OUTPATIENT
Start: 2023-11-06

## 2024-06-28 ENCOUNTER — OFFICE VISIT (OUTPATIENT)
Dept: ENDOCRINOLOGY | Facility: CLINIC | Age: 59
End: 2024-06-28
Payer: COMMERCIAL

## 2024-06-28 VITALS
HEIGHT: 66 IN | SYSTOLIC BLOOD PRESSURE: 110 MMHG | BODY MASS INDEX: 34.52 KG/M2 | OXYGEN SATURATION: 97 % | WEIGHT: 214.8 LBS | HEART RATE: 78 BPM | DIASTOLIC BLOOD PRESSURE: 76 MMHG

## 2024-06-28 DIAGNOSIS — E04.2 MULTINODULAR GOITER (NONTOXIC): Primary | ICD-10-CM

## 2024-06-28 NOTE — ASSESSMENT & PLAN NOTE
-Right lower lobe nodule has grown in size from prior ultrasound in 2020 although it is mostly due to the cystic portion and overall has fairly benign features; offered FNA to patient today versus close monitoring with serial ultrasound and she prefers to defer FNA and follow-up with ultrasound  -At this time I do not think her throat tightness and trouble swallowing is due to her thyroid nodules and recommend additional workup from ENT  -Recommend repeating thyroid ultrasound in 6 months or sooner with changes in compressive symptoms or change in physical exam

## 2024-10-17 ENCOUNTER — OFFICE VISIT (OUTPATIENT)
Dept: FAMILY MEDICINE CLINIC | Facility: CLINIC | Age: 59
End: 2024-10-17
Payer: COMMERCIAL

## 2024-10-17 VITALS
TEMPERATURE: 97.7 F | HEART RATE: 88 BPM | SYSTOLIC BLOOD PRESSURE: 130 MMHG | WEIGHT: 212 LBS | OXYGEN SATURATION: 99 % | HEIGHT: 66 IN | DIASTOLIC BLOOD PRESSURE: 80 MMHG | BODY MASS INDEX: 34.07 KG/M2

## 2024-10-17 DIAGNOSIS — R09.A2 SENSATION OF LUMP IN THROAT: Primary | ICD-10-CM

## 2024-10-17 PROCEDURE — 99213 OFFICE O/P EST LOW 20 MIN: CPT | Performed by: PHYSICIAN ASSISTANT

## 2024-10-17 RX ORDER — EPINEPHRINE 0.3 MG/.3ML
INJECTION SUBCUTANEOUS
COMMUNITY
Start: 2024-06-11

## 2024-10-17 RX ORDER — OXYCODONE AND ACETAMINOPHEN 5; 325 MG/1; MG/1
TABLET ORAL
COMMUNITY
Start: 2024-07-20 | End: 2024-10-17

## 2024-10-17 NOTE — PROGRESS NOTES
"Chief Complaint  tightness in throat (Going on for a year )    Subjective          Diana Fulton presents to [facility]  History of Present Illness  Patient in today for evaluation on recurrent sensation of something stuck in throat/tightness to throat area. She saw endocrinology a few months ago and was not felt related to thyroid and was recommended to f/up with ENT. She denies pain with swallowing. States has been going on for past year but occurring more frequent past few months. Now tends to be daily. Denies choking on food or drinks. Does not feel like food gets stuck. She denies any significant reflux symptoms.       Objective   Vital Signs:   /80   Pulse 88   Temp 97.7 °F (36.5 °C)   Ht 166.4 cm (65.5\")   Wt 96.2 kg (212 lb)   SpO2 99%   BMI 34.74 kg/m²     Body mass index is 34.74 kg/m².    Review of Systems   Constitutional:  Negative for fever.   HENT:  Negative for congestion and sore throat.    Respiratory:  Negative for cough and shortness of breath.    Cardiovascular:  Negative for chest pain.   Gastrointestinal:  Negative for abdominal pain, diarrhea, nausea, vomiting and GERD.       Past History:  Medical History: has a past medical history of Allergic, Anxiety, Arthritis, Bulging lumbar disc, Cancer (2022), Depression, Goiter, Headache, Joint pain, and Rash.   Surgical History: has a past surgical history that includes Cholecystectomy;  section; Hysterectomy; Tonsillectomy; Adenoidectomy; Breast surgery; and Lymph node biopsy.   Family History: family history includes ALS in her mother; Cancer in her maternal aunt, maternal aunt, maternal grandmother, and paternal grandfather; Diabetes type II in her father; Heart attack in her maternal grandmother; Heart disease in her maternal uncle; Hypertension in her father; No Known Problems in her brother, maternal grandfather, paternal grandmother, and sister.   Social History: reports that " she quit smoking about 17 years ago. Her smoking use included cigarettes. She has never used smokeless tobacco. She reports that she does not currently use alcohol. She reports that she does not use drugs.      Current Outpatient Medications:     albuterol sulfate  (90 Base) MCG/ACT inhaler, Inhale 2 puffs Every 6 (Six) Hours As Needed for Wheezing., Disp: 18 g, Rfl: 0    cetirizine (zyrTEC) 10 MG tablet, Take 1 tablet by mouth Daily., Disp: , Rfl:     cholecalciferol (VITAMIN D3) 25 MCG (1000 UT) tablet, Take 1 tablet by mouth Daily., Disp: , Rfl:     EPINEPHrine (EPIPEN) 0.3 MG/0.3ML solution auto-injector injection, Has epi pen for alpha gal, Disp: , Rfl:     loratadine (CLARITIN) 10 MG tablet, Take 1 tablet by mouth Daily., Disp: , Rfl:     Multiple Vitamins-Minerals (MULTIVITAMIN ADULT PO), Take  by mouth., Disp: , Rfl:     naproxen sodium (ALEVE) 220 MG tablet, Take 1 tablet by mouth 2 (Two) Times a Day As Needed., Disp: , Rfl:     vitamin B-12 (CYANOCOBALAMIN) 500 MCG tablet, Take 1 tablet by mouth Daily., Disp: , Rfl:   Allergies: Adhesive tape and Latex    Physical Exam  Constitutional:       Appearance: Normal appearance.   HENT:      Right Ear: Tympanic membrane normal.      Left Ear: Tympanic membrane normal.      Mouth/Throat:      Pharynx: Oropharynx is clear.   Eyes:      Conjunctiva/sclera: Conjunctivae normal.      Pupils: Pupils are equal, round, and reactive to light.   Cardiovascular:      Rate and Rhythm: Normal rate and regular rhythm.      Heart sounds: Normal heart sounds.   Pulmonary:      Effort: Pulmonary effort is normal.      Breath sounds: Normal breath sounds.   Neurological:      Mental Status: She is oriented to person, place, and time.   Psychiatric:         Mood and Affect: Mood normal.         Behavior: Behavior normal.             Assessment and Plan   Diagnoses and all orders for this visit:    1. Sensation of lump in throat (Primary)  -     Ambulatory Referral to ENT  (Otolaryngology)  Recommend consult with ENT for further evaluation and will put in for referral. Monitor symptoms and rtc or to ER for any acute worsening of symptoms if needed.           Follow Up   No follow-ups on file.  Patient was given instructions and counseling regarding her condition or for health maintenance advice. Please see specific information pulled into the AVS if appropriate.     Olivia Byrnes PA-C

## 2024-12-20 ENCOUNTER — OFFICE VISIT (OUTPATIENT)
Dept: ENDOCRINOLOGY | Facility: CLINIC | Age: 59
End: 2024-12-20
Payer: COMMERCIAL

## 2024-12-20 VITALS
SYSTOLIC BLOOD PRESSURE: 128 MMHG | BODY MASS INDEX: 34.87 KG/M2 | WEIGHT: 217 LBS | HEIGHT: 66 IN | HEART RATE: 76 BPM | DIASTOLIC BLOOD PRESSURE: 72 MMHG

## 2024-12-20 DIAGNOSIS — E04.2 MULTINODULAR GOITER (NONTOXIC): Primary | ICD-10-CM

## 2024-12-20 RX ORDER — OMEPRAZOLE 40 MG/1
CAPSULE, DELAYED RELEASE ORAL
COMMUNITY
Start: 2024-12-02

## 2024-12-20 NOTE — ASSESSMENT & PLAN NOTE
-Recommend FNA of right middle and lower lobe nodules; patient would like to defer for another month as she is still recovering from bilateral mastectomy for breast cancer which I think is a reasonable decision  -I will bring her back in about a month for FNA of these nodules  -Her other nodules are small and have benign features and do not require follow-up

## 2025-01-29 NOTE — PROGRESS NOTES
Chief complaint/Reason for consult: Thyroid nodules    HPI: Patient is a 59year old female here for follow-up of thyroid nodules.  She was last seen 12/20/2024 and returns today for FNA of thyroid nodule.     # Thyroid nodule:   - First noted on physical exam in 2020  - Last thyroid ultrasound: 12/20/2024 showing a 1.21 cm right mid thyroid lobe nodule, 1.75 cm right lower lobe nodule and a 0.63 cm right medial lobe nodule along with additional subcentimeter spongiform nodules and cysts  - Prior FNA's: None  - Reports some continued dysphagia and neck tightness, denies change in voice or shortness of breath  - Denies history of radiation to the head/neck (radiation done after lumpectomy in the past)   - No known family history of thyroid cancer  - No tobacco use    - TSH 0.995 done 2/8/2024    Review of Systems   Constitutional:  Negative for activity change.   HENT:  Positive for trouble swallowing. Negative for voice change.    Respiratory:  Positive for choking.    Cardiovascular:  Negative for chest pain.   Musculoskeletal:  Negative for gait problem.   Psychiatric/Behavioral:  Negative for agitation.         Physical Exam  Vitals reviewed.   Constitutional:       General: She is not in acute distress.  Cardiovascular:      Rate and Rhythm: Normal rate.      Pulses: Normal pulses.   Abdominal:      Tenderness: There is no guarding.   Musculoskeletal:      Cervical back: Neck supple.   Neurological:      Mental Status: She is alert.        Neck Ultrasound Report    Indication: Thyroid nodule    Comparison Imaging: yes     Images saved to PACS.     Ultrasound Guided Thyroid Biopsy    Comparison Report: No    Indication:  Thyroid nodule    After informed consent, the neck was prepped in sterile fashion. Real time high resolution ultrasound imaging demonstrated a    With ultrasound guidance of needle localization, 4 aspirates were obtained with sterile 27g. needles of 2 separate nodules for a total of 8 aspirates.  Sample was placed in CytoLyte and sample was also collected for Afirma genetic testing for the larger nodule only if needed.    The patient tolerated the procedure without difficulty or complications.      Assessment and plan:     Diagnoses and all orders for this visit:    1. Multiple thyroid nodules (Primary)  Assessment & Plan:  -Right middle and right lower lobe nodule FNA completed today  -Right middle lobe nodule FNA was technically difficult due to small size and only cytology was sent  -Right lower lobe nodule was completed without complications and was sent for cytology and Afirma  -Repeat thyroid ultrasound in 6 months     Orders:  -     US Thyroid; Future       Return in about 6 months (around 7/30/2025) for Thyroid nodule.     Electronically signed by Kyle S Rosenstein, MD   FNA 1/30/2025  Both nodules consistent with benign follicular nodule  Discussed results with patient on the phone

## 2025-01-30 ENCOUNTER — OFFICE VISIT (OUTPATIENT)
Dept: ENDOCRINOLOGY | Facility: CLINIC | Age: 60
End: 2025-01-30
Payer: COMMERCIAL

## 2025-01-30 VITALS
BODY MASS INDEX: 34.6 KG/M2 | OXYGEN SATURATION: 100 % | HEIGHT: 66 IN | WEIGHT: 215.3 LBS | DIASTOLIC BLOOD PRESSURE: 86 MMHG | SYSTOLIC BLOOD PRESSURE: 150 MMHG | HEART RATE: 72 BPM

## 2025-01-30 DIAGNOSIS — E04.2 MULTIPLE THYROID NODULES: Primary | ICD-10-CM

## 2025-01-30 RX ORDER — ESTRADIOL 0.1 MG/G
2 CREAM VAGINAL DAILY
COMMUNITY
Start: 2025-01-17

## 2025-01-30 NOTE — ASSESSMENT & PLAN NOTE
-Right middle and right lower lobe nodule FNA completed today  -Right middle lobe nodule FNA was technically difficult due to small size and only cytology was sent  -Right lower lobe nodule was completed without complications and was sent for cytology and Afirma  -Repeat thyroid ultrasound in 6 months

## 2025-01-31 LAB — REF LAB TEST METHOD: NORMAL

## 2025-02-10 ENCOUNTER — OFFICE VISIT (OUTPATIENT)
Dept: FAMILY MEDICINE CLINIC | Facility: CLINIC | Age: 60
End: 2025-02-10
Payer: COMMERCIAL

## 2025-02-10 VITALS
BODY MASS INDEX: 36.15 KG/M2 | TEMPERATURE: 98.3 F | WEIGHT: 217 LBS | SYSTOLIC BLOOD PRESSURE: 120 MMHG | OXYGEN SATURATION: 97 % | DIASTOLIC BLOOD PRESSURE: 80 MMHG | HEIGHT: 65 IN | HEART RATE: 84 BPM
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
## 2025-02-10 DIAGNOSIS — Z00.00 ROUTINE PHYSICAL EXAMINATION: Primary | ICD-10-CM

## 2025-02-10 DIAGNOSIS — R10.84 GENERALIZED ABDOMINAL PAIN: ICD-10-CM

## 2025-02-10 DIAGNOSIS — Z11.59 ENCOUNTER FOR HEPATITIS C SCREENING TEST FOR LOW RISK PATIENT: ICD-10-CM

## 2025-02-10 DIAGNOSIS — Z13.220 LIPID SCREENING: ICD-10-CM

## 2025-02-10 DIAGNOSIS — Z13.1 DIABETES MELLITUS SCREENING: ICD-10-CM

## 2025-02-10 DIAGNOSIS — R13.10 DYSPHAGIA, UNSPECIFIED TYPE: ICD-10-CM

## 2025-02-10 LAB
BILIRUB BLD-MCNC: NEGATIVE MG/DL
CLARITY, POC: CLEAR
COLOR UR: YELLOW
GLUCOSE UR STRIP-MCNC: NEGATIVE MG/DL
KETONES UR QL: NEGATIVE
LEUKOCYTE EST, POC: NEGATIVE
NITRITE UR-MCNC: NEGATIVE MG/ML
PH UR: 5.5 [PH] (ref 5–8)
PROT UR STRIP-MCNC: NEGATIVE MG/DL
RBC # UR STRIP: ABNORMAL /UL
SP GR UR: 1.03 (ref 1–1.03)
UROBILINOGEN UR QL: NORMAL

## 2025-02-10 PROCEDURE — 99396 PREV VISIT EST AGE 40-64: CPT | Performed by: PHYSICIAN ASSISTANT

## 2025-02-10 PROCEDURE — 81002 URINALYSIS NONAUTO W/O SCOPE: CPT | Performed by: PHYSICIAN ASSISTANT

## 2025-02-10 RX ORDER — DOXYCYCLINE 100 MG/1
100 CAPSULE ORAL 2 TIMES DAILY
Qty: 20 CAPSULE | Refills: 0 | Status: SHIPPED | OUTPATIENT
Start: 2025-02-10

## 2025-02-10 NOTE — PROGRESS NOTES
"Chief Complaint  referral (Pt wants referral for GI for tightness in her throat off and on for over a year ), Tick Removal (Pt states removed a tick off her collar bone Saturday ), and Annual Exam    Subjective          Diana Fulton presents to [facility]  History of Present Illness  Patient in today for routine physical exam and would like to rtc for fasting labs. She also would like to get a referral to GI. She has had recurrent issues with tightness in throat area that comes/goes and on a few occasions food has gotten stuck and had to swallow often/ drink more fluids to get to go down. She did see ENT and states they did not see any concerns with throat and thought could be related to reflux- is taking omeprazole. She denies vomiting or diarrhea. Denies blood in stool . States has had recurrent pinching type pains to bilateral sides of abdomen intermittently for several months. Thought may be associated with some mild constipation- states took laxative and may have minimally helped. States comes and goes and does not last long when it occurs and is not all the time. Denies fevers with it. While here, she also states pulled a tick off left collarbone last week. States tick was dead- got it out in its entirety, unsure of how long had been there. She has previously been diagnosed with alpha gal syndrome and carries epipen .  Denies any rash around area but has remained sore and first day stated it had some mild red streaking from it but that has since gone away. Denies fever. She states is currently utd on colon cancer screening with cologuard and had bilateral mastectomy . She follows with oncology and endocrinology .      Objective   Vital Signs:   /80   Pulse 84   Temp 98.3 °F (36.8 °C)   Ht 165.1 cm (65\")   Wt 98.4 kg (217 lb)   SpO2 97%   BMI 36.11 kg/m²     Body mass index is 36.11 kg/m².    Review of Systems   Constitutional:  Negative for fatigue and fever. "   HENT:  Negative for congestion and sore throat.    Respiratory:  Negative for cough and shortness of breath.    Cardiovascular:  Negative for chest pain.   Gastrointestinal:  Positive for GERD. Negative for abdominal pain, diarrhea, nausea and vomiting.        Dysphagia   Skin:         Tick bite to left clavicle area ; no rash   Neurological:  Negative for dizziness and headache.       Past History:  Medical History: has a past medical history of Allergic, Anxiety, Arthritis, Bulging lumbar disc, Cancer (2022), Depression, Goiter, Headache, Joint pain, and Rash.   Surgical History: has a past surgical history that includes Cholecystectomy;  section; Hysterectomy; Tonsillectomy; Adenoidectomy; Breast surgery; and Lymph node biopsy.   Family History: family history includes ALS in her mother; Cancer in her father, maternal aunt, maternal aunt, maternal grandmother, paternal grandfather, and sister; Diabetes type II in her father; Heart attack in her maternal grandmother; Heart disease in her maternal uncle; Hypertension in her father; No Known Problems in her brother, maternal grandfather, paternal grandmother, and sister.   Social History: reports that she quit smoking about 17 years ago. Her smoking use included cigarettes. She has never used smokeless tobacco. She reports that she does not currently use alcohol. She reports that she does not use drugs.      Current Outpatient Medications:     albuterol sulfate  (90 Base) MCG/ACT inhaler, Inhale 2 puffs Every 6 (Six) Hours As Needed for Wheezing., Disp: 18 g, Rfl: 0    cetirizine (zyrTEC) 10 MG tablet, Take 1 tablet by mouth Daily., Disp: , Rfl:     cholecalciferol (VITAMIN D3) 25 MCG (1000 UT) tablet, Take 1 tablet by mouth Daily., Disp: , Rfl:     EPINEPHrine (EPIPEN) 0.3 MG/0.3ML solution auto-injector injection, Has epi pen for alpha gal, Disp: , Rfl:     estradiol (ESTRACE) 0.1 MG/GM vaginal cream, Insert 2 g into the vagina Daily.,  Disp: , Rfl:     loratadine (CLARITIN) 10 MG tablet, Take 1 tablet by mouth Daily., Disp: , Rfl:     Multiple Vitamins-Minerals (MULTIVITAMIN ADULT PO), Take  by mouth., Disp: , Rfl:     naproxen sodium (ALEVE) 220 MG tablet, Take 1 tablet by mouth 2 (Two) Times a Day As Needed., Disp: , Rfl:     omeprazole (priLOSEC) 40 MG capsule, , Disp: , Rfl:     vitamin B-12 (CYANOCOBALAMIN) 500 MCG tablet, Take 1 tablet by mouth Daily., Disp: , Rfl:     doxycycline (VIBRAMYCIN) 100 MG capsule, Take 1 capsule by mouth 2 (Two) Times a Day., Disp: 20 capsule, Rfl: 0  Allergies: Adhesive tape and Latex    Physical Exam  Constitutional:       Appearance: Normal appearance.   HENT:      Right Ear: Tympanic membrane normal.      Left Ear: Tympanic membrane normal.      Mouth/Throat:      Pharynx: Oropharynx is clear.   Eyes:      Conjunctiva/sclera: Conjunctivae normal.      Pupils: Pupils are equal, round, and reactive to light.   Cardiovascular:      Rate and Rhythm: Normal rate and regular rhythm.      Heart sounds: Normal heart sounds.   Pulmonary:      Effort: Pulmonary effort is normal.      Breath sounds: Normal breath sounds.   Abdominal:      Palpations: Abdomen is soft.      Tenderness: There is no abdominal tenderness.   Skin:     Comments: Small area of redness to left clavicle area; no FB noted ; no heat/surrounding redness noted    Neurological:      Mental Status: She is oriented to person, place, and time.   Psychiatric:         Mood and Affect: Mood normal.         Behavior: Behavior normal.             Assessment and Plan   Diagnoses and all orders for this visit:    1. Routine physical examination (Primary)  -     Cancel: CBC & Differential  Encouraged healthy diet and exercise; will rtc for fasting labs; encouraged to stay utd on eye and dental exams and follow up with endocrinology and oncology as directed   2. Tick bite, unspecified site, initial encounter  -     Cancel: Lyme Disease Total Antibody With Reflex  to Immunoassay  -     Cancel: Spotted Fever Group AB, IgG/IgM  -     Lyme Disease Total Antibody With Reflex to Immunoassay  -     Spotted Fever Group AB, IgG/IgM  Will check tick bite labs-- have also given prescription of doxycycline- to monitor symptoms closely and fill if needed   3. Generalized abdominal pain  -     Cancel: CBC & Differential  -     Cancel: Amylase  -     Cancel: Lipase  -     POC Urinalysis Dipstick  -     Urine Culture - Urine, Urine, Clean Catch  -     Urinalysis With Microscopic - Urine, Clean Catch  -     CBC & Differential  -     Comprehensive Metabolic Panel  -     Amylase  -     Lipase  Will check a few additional labs for abdominal discomfort-urine dip today;  with recurrent symptoms recommend to f/up with GI regarding - rtc or to ER for any acute/worsening symptoms if needed   4. Lipid screening  -     Cancel: Lipid Panel  -     Lipid Panel  Will check fasting lipid panel with labs.   5. Diabetes mellitus screening  -     Cancel: Comprehensive Metabolic Panel  -     Cancel: Hemoglobin A1c  -     Hemoglobin A1c  Will check hga1c with labs.   6. Encounter for hepatitis C screening test for low risk patient  Will check hep c screening.   7. Dysphagia, unspecified type  -     Ambulatory Referral to Gastroenterology  Will put in referral for GI for consult on dysphagia and recommend f/up on recurrent abdominal discomfort   Other orders  -     doxycycline (VIBRAMYCIN) 100 MG capsule; Take 1 capsule by mouth 2 (Two) Times a Day.  Dispense: 20 capsule; Refill: 0            Follow Up   No follow-ups on file.  Patient was given instructions and counseling regarding her condition or for health maintenance advice. Please see specific information pulled into the AVS if appropriate.     Olivia Byrnes PA-C

## 2025-02-12 LAB
APPEARANCE UR: ABNORMAL
BACTERIA #/AREA URNS HPF: ABNORMAL /[HPF]
BACTERIA UR CULT: NORMAL
BACTERIA UR CULT: NORMAL
BILIRUB UR QL STRIP: NEGATIVE
CASTS URNS QL MICRO: ABNORMAL /LPF
COLOR UR: YELLOW
EPI CELLS #/AREA URNS HPF: ABNORMAL /HPF (ref 0–10)
GLUCOSE UR QL STRIP: NEGATIVE
HGB UR QL STRIP: NEGATIVE
KETONES UR QL STRIP: NEGATIVE
LEUKOCYTE ESTERASE UR QL STRIP: NEGATIVE
MICRO URNS: ABNORMAL
MICRO URNS: ABNORMAL
NITRITE UR QL STRIP: NEGATIVE
PH UR STRIP: 5 [PH] (ref 5–7.5)
PROT UR QL STRIP: NEGATIVE
RBC #/AREA URNS HPF: ABNORMAL /HPF (ref 0–2)
SP GR UR STRIP: 1.02 (ref 1–1.03)
UROBILINOGEN UR STRIP-MCNC: 0.2 MG/DL (ref 0.2–1)
WBC #/AREA URNS HPF: ABNORMAL /HPF (ref 0–5)

## 2025-02-25 ENCOUNTER — LAB (OUTPATIENT)
Dept: FAMILY MEDICINE CLINIC | Facility: CLINIC | Age: 60
End: 2025-02-25
Payer: COMMERCIAL

## 2025-02-28 LAB
ALBUMIN SERPL-MCNC: 4.6 G/DL (ref 3.8–4.9)
ALP SERPL-CCNC: 66 IU/L (ref 44–121)
ALT SERPL-CCNC: 20 IU/L (ref 0–32)
AMYLASE SERPL-CCNC: 65 U/L (ref 31–110)
AST SERPL-CCNC: 26 IU/L (ref 0–40)
B BURGDOR IGG+IGM SER QL IA: NEGATIVE
BASOPHILS # BLD AUTO: 0.1 X10E3/UL (ref 0–0.2)
BASOPHILS NFR BLD AUTO: 1 %
BILIRUB SERPL-MCNC: 0.6 MG/DL (ref 0–1.2)
BUN SERPL-MCNC: 14 MG/DL (ref 6–24)
BUN/CREAT SERPL: 21 (ref 9–23)
CALCIUM SERPL-MCNC: 10 MG/DL (ref 8.7–10.2)
CHLORIDE SERPL-SCNC: 104 MMOL/L (ref 96–106)
CHOLEST SERPL-MCNC: 207 MG/DL (ref 100–199)
CO2 SERPL-SCNC: 25 MMOL/L (ref 20–29)
CREAT SERPL-MCNC: 0.68 MG/DL (ref 0.57–1)
EGFRCR SERPLBLD CKD-EPI 2021: 102 ML/MIN/1.73
EOSINOPHIL # BLD AUTO: 0.3 X10E3/UL (ref 0–0.4)
EOSINOPHIL NFR BLD AUTO: 6 %
ERYTHROCYTE [DISTWIDTH] IN BLOOD BY AUTOMATED COUNT: 12.6 % (ref 11.7–15.4)
GLOBULIN SER CALC-MCNC: 2.6 G/DL (ref 1.5–4.5)
GLUCOSE SERPL-MCNC: 92 MG/DL (ref 70–99)
HBA1C MFR BLD: 5.7 % (ref 4.8–5.6)
HCT VFR BLD AUTO: 42.6 % (ref 34–46.6)
HCV IGG SERPL QL IA: NON REACTIVE
HDLC SERPL-MCNC: 63 MG/DL
HGB BLD-MCNC: 13.2 G/DL (ref 11.1–15.9)
IMM GRANULOCYTES # BLD AUTO: 0 X10E3/UL (ref 0–0.1)
IMM GRANULOCYTES NFR BLD AUTO: 0 %
LDLC SERPL CALC-MCNC: 123 MG/DL (ref 0–99)
LIPASE SERPL-CCNC: 22 U/L (ref 14–72)
LYMPHOCYTES # BLD AUTO: 1.3 X10E3/UL (ref 0.7–3.1)
LYMPHOCYTES NFR BLD AUTO: 22 %
MCH RBC QN AUTO: 29.5 PG (ref 26.6–33)
MCHC RBC AUTO-ENTMCNC: 31 G/DL (ref 31.5–35.7)
MCV RBC AUTO: 95 FL (ref 79–97)
MONOCYTES # BLD AUTO: 0.4 X10E3/UL (ref 0.1–0.9)
MONOCYTES NFR BLD AUTO: 7 %
NEUTROPHILS # BLD AUTO: 3.6 X10E3/UL (ref 1.4–7)
NEUTROPHILS NFR BLD AUTO: 64 %
PLATELET # BLD AUTO: 349 X10E3/UL (ref 150–450)
POTASSIUM SERPL-SCNC: 4.6 MMOL/L (ref 3.5–5.2)
PROT SERPL-MCNC: 7.2 G/DL (ref 6–8.5)
RBC # BLD AUTO: 4.48 X10E6/UL (ref 3.77–5.28)
RESULT COMMENT:: NORMAL
RICK SF IGG TITR SER: NORMAL {TITER}
RICK SF IGM TITR SER: NORMAL {TITER}
SODIUM SERPL-SCNC: 142 MMOL/L (ref 134–144)
TRIGL SERPL-MCNC: 117 MG/DL (ref 0–149)
VLDLC SERPL CALC-MCNC: 21 MG/DL (ref 5–40)
WBC # BLD AUTO: 5.7 X10E3/UL (ref 3.4–10.8)

## 2025-06-09 ENCOUNTER — OFFICE VISIT (OUTPATIENT)
Dept: GASTROENTEROLOGY | Facility: CLINIC | Age: 60
End: 2025-06-09
Payer: COMMERCIAL

## 2025-06-09 VITALS
WEIGHT: 213.4 LBS | BODY MASS INDEX: 35.56 KG/M2 | HEIGHT: 65 IN | HEART RATE: 68 BPM | OXYGEN SATURATION: 99 % | RESPIRATION RATE: 18 BRPM | TEMPERATURE: 98.7 F

## 2025-06-09 DIAGNOSIS — R13.19 ESOPHAGEAL DYSPHAGIA: Primary | ICD-10-CM

## 2025-06-09 DIAGNOSIS — K21.9 GASTROESOPHAGEAL REFLUX DISEASE, UNSPECIFIED WHETHER ESOPHAGITIS PRESENT: ICD-10-CM

## 2025-06-09 PROCEDURE — 99204 OFFICE O/P NEW MOD 45 MIN: CPT | Performed by: PHYSICIAN ASSISTANT

## 2025-06-09 NOTE — PROGRESS NOTES
"     New Patient Consultation     Patient Name: Diana Fulton  : 1965   MRN: [id number]     Chief Complaint:    Chief Complaint   Patient presents with    Difficulty Swallowing       History of Present Illness: Diana Fulton is a 60 y.o. female, PMH includes anxiety, depression, thyroid disease, who is here today for a Gastroenterology Consultation for esophageal dysphagia.    Pt notes few year h/o progressively worsened proximal esophageal dysphagia, which she describes as \"fullness\" or \"swelling\" that causes increased work of swallowing. She was evaluated by ENT, who prescribed omeprazole 40mg daily a few months ago. She notes some improvement of chronic GERD and dysphagia with such. Patient denies associated fever, chills, abdominal pain, nausea, vomiting, diarrhea, constipation, hematemesis, hematochezia, melena, bloating, weight loss or gain, dysuria, jaundice or bruising.    Patient denies personal or FHx of PUD, H Pylori, gastritis, pancreatitis, colitis, Celiac disease, UC, Crohn's disease, IBS or gastric cancers. Maternal grandmother with colon cancer. Pt denies EtOH, tobacco, illicit substance or NSAID use. S/p C section, CCY, BRITANY.     No previous EGD / CSY. Cologuard negative 2022.     Subjective      Review of Systems:   Review of Systems   Constitutional:  Negative for appetite change, chills, diaphoresis, fatigue, fever, unexpected weight gain and unexpected weight loss.   HENT:  Positive for trouble swallowing. Negative for drooling, facial swelling, mouth sores, rhinorrhea, sore throat, tinnitus and voice change.    Eyes: Negative.    Respiratory:  Negative for cough, chest tightness and shortness of breath.    Cardiovascular:  Negative for chest pain.   Gastrointestinal:  Positive for GERD. Negative for abdominal pain, blood in stool, constipation, diarrhea, nausea, vomiting and indigestion.   Genitourinary:  Negative for dysuria, flank pain, hematuria and pelvic pain.   Skin:  " Negative for color change, pallor and rash.   All other systems reviewed and are negative.      Past Medical History:   Past Medical History:   Diagnosis Date    Allergic     Seasonal    Anxiety     Arthritis     Bulging lumbar disc     Cancer 2022    DCIS    Depression     Goiter     Headache     Joint pain     elbows and knees     Rash        Past Surgical History:   Past Surgical History:   Procedure Laterality Date    ADENOIDECTOMY      BREAST SURGERY       SECTION      CHOLECYSTECTOMY      HYSTERECTOMY      LYMPH NODE BIOPSY      TONSILLECTOMY         Family History:   Family History   Problem Relation Age of Onset    ALS Mother     Hypertension Father     Diabetes type II Father     Cancer Father     No Known Problems Sister     No Known Problems Brother     Cancer Maternal Aunt         BREAST    Cancer Maternal Aunt         KIDNEY    Heart disease Maternal Uncle     Heart attack Maternal Grandmother     Cancer Maternal Grandmother         COLON    No Known Problems Maternal Grandfather     No Known Problems Paternal Grandmother     Cancer Paternal Grandfather         PANCREATIC    Cancer Sister        Social History:   Social History     Socioeconomic History    Marital status: Single   Tobacco Use    Smoking status: Former     Current packs/day: 0.00     Types: Cigarettes     Quit date: 2007     Years since quittin.8    Smokeless tobacco: Never   Vaping Use    Vaping status: Never Used   Substance and Sexual Activity    Alcohol use: Not Currently     Comment: moderate, 1 EVRY OTHER WEEK    Drug use: No    Sexual activity: Yes     Partners: Male     Birth control/protection: Hysterectomy       Alcohol/Tobacco History:   Social History     Substance and Sexual Activity   Alcohol Use Not Currently    Comment: moderate, 1 EVRY OTHER WEEK     Social History     Tobacco Use   Smoking Status Former    Current packs/day: 0.00    Types: Cigarettes    Quit date: 2007    Years since  quittin.8   Smokeless Tobacco Never       Medications:     Current Outpatient Medications:     albuterol sulfate  (90 Base) MCG/ACT inhaler, Inhale 2 puffs Every 6 (Six) Hours As Needed for Wheezing., Disp: 18 g, Rfl: 0    cetirizine (zyrTEC) 10 MG tablet, Take 1 tablet by mouth Daily., Disp: , Rfl:     cholecalciferol (VITAMIN D3) 25 MCG (1000 UT) tablet, Take 1 tablet by mouth Daily., Disp: , Rfl:     EPINEPHrine (EPIPEN) 0.3 MG/0.3ML solution auto-injector injection, Has epi pen for alpha gal, Disp: , Rfl:     estradiol (ESTRACE) 0.1 MG/GM vaginal cream, Insert 2 g into the vagina Daily., Disp: , Rfl:     loratadine (CLARITIN) 10 MG tablet, Take 1 tablet by mouth Daily., Disp: , Rfl:     Multiple Vitamins-Minerals (MULTIVITAMIN ADULT PO), Take  by mouth., Disp: , Rfl:     omeprazole (priLOSEC) 40 MG capsule, , Disp: , Rfl:     vitamin B-12 (CYANOCOBALAMIN) 500 MCG tablet, Take 1 tablet by mouth Daily., Disp: , Rfl:     doxycycline (VIBRAMYCIN) 100 MG capsule, Take 1 capsule by mouth 2 (Two) Times a Day., Disp: 20 capsule, Rfl: 0    naproxen sodium (ALEVE) 220 MG tablet, Take 1 tablet by mouth 2 (Two) Times a Day As Needed., Disp: , Rfl:     Allergies:   Allergies   Allergen Reactions    Adhesive Tape Dermatitis    Latex Rash     Rash at site of contact         Objective     Physical Exam:  Vital Signs: There were no vitals filed for this visit.  There is no height or weight on file to calculate BMI.     Physical Exam  Vitals and nursing note reviewed.   Constitutional:       Appearance: Normal appearance. She is normal weight. She is not ill-appearing or diaphoretic.      Comments: BMI 35.51   HENT:      Head: Normocephalic and atraumatic.      Right Ear: External ear normal.      Left Ear: External ear normal.      Nose: Nose normal.      Mouth/Throat:      Mouth: Mucous membranes are moist.      Pharynx: Oropharynx is clear.   Eyes:      Conjunctiva/sclera: Conjunctivae normal.      Pupils: Pupils  are equal, round, and reactive to light.   Neck:      Thyroid: No thyromegaly.   Cardiovascular:      Rate and Rhythm: Normal rate and regular rhythm.      Pulses: Normal pulses.      Heart sounds: Normal heart sounds.   Pulmonary:      Effort: Pulmonary effort is normal.      Breath sounds: Normal breath sounds.   Abdominal:      General: Abdomen is flat. Bowel sounds are normal. There is no distension.      Tenderness: There is no abdominal tenderness.   Musculoskeletal:         General: Normal range of motion.      Cervical back: Normal range of motion and neck supple.   Skin:     General: Skin is warm and dry.   Neurological:      General: No focal deficit present.      Mental Status: She is oriented to person, place, and time.   Psychiatric:         Mood and Affect: Mood normal.         Assessment / Plan      Assessment/Plan:   There are no diagnoses linked to this encounter.     GERD  Esophageal dysphagia   - continue omeprazole 40mg daily    - pt given GERD diet instructions, advised to avoid GI irritants such as caffeine, carbonation, EtOH, tobacco, chocolate, peppermint, acid-based foods   - schedule for EGD   - follow up in clinic after completion of above studies   - call clinic at any time for questions or new / worsened sx    Follow Up:   Return for Next scheduled follow up.    Plan of care reviewed with the patient at the conclusion of today's visit.  Education was provided regarding diagnosis, management, and any prescribed or recommended OTC medications.  Patient verbalized understanding of and agreement with management plan.     NOTE TO PATIENT: The 21st Century Cures Act makes medical notes like these available to patients in the interest of transparency. However, be advised this is a medical document. It is intended as peer to peer communication. It is written in medical language and may contain abbreviations or verbiage that are unfamiliar. It may appear blunt or direct. Medical documents are  intended to carry relevant information, facts as evident, and the clinical opinion of the practitioner.     Time Statement:   Discussed plan of care in detail with patient today. Patient verbally understands and agrees. I have spent 45 minutes reviewing available diagnostics, obtaining history, examining the patient, developing a treatment plan, and educating the patient on disease process and plan of care.    Kimberli Vitale PA-C   [facility]

## 2025-07-30 NOTE — PROGRESS NOTES
Chief complaint/Reason for consult: Thyroid nodules    HPI:  Patient is a 60 year old female here for follow-up of thyroid nodules. ongoing compressive symptoms in her neck.  She has further workup from she was last seen 1/30/2025 and reports since that time gastroenterology for EGD to look at reflux.    # Thyroid nodule:   - First noted on physical exam in 2020  - Last thyroid ultrasound: 12/20/2024 showing a 1.21 cm right mid thyroid lobe nodule, 1.75 cm right lower lobe nodule and a 0.63 cm right medial lobe nodule along with additional subcentimeter spongiform nodules and cysts  - Prior FNA's: 1/30/2025    Right middle lobe benign follicular nodule  Right lower lobe benign follicular nodule    - Reports some continued dysphagia and neck tightness, denies change in voice or shortness of breath  - Denies history of radiation to the head/neck (radiation done after lumpectomy in the past)   - No known family history of thyroid cancer  - No tobacco use    - TSH 0.995 done 2/8/2024    Review of Systems   Constitutional:  Negative for activity change.   HENT:  Positive for trouble swallowing. Negative for voice change.    Respiratory:  Negative for shortness of breath.    Gastrointestinal:  Negative for abdominal pain.   Musculoskeletal:  Negative for gait problem.   Neurological:  Negative for numbness.   Psychiatric/Behavioral:  Negative for agitation.         Physical Exam  Vitals reviewed.   Constitutional:       General: She is not in acute distress.  HENT:      Head: Normocephalic.   Eyes:      Conjunctiva/sclera: Conjunctivae normal.   Cardiovascular:      Rate and Rhythm: Normal rate.      Pulses: Normal pulses.   Pulmonary:      Effort: Pulmonary effort is normal.   Abdominal:      Tenderness: There is no guarding.   Musculoskeletal:         General: No deformity.      Cervical back: Neck supple.   Skin:     General: Skin is warm and dry.   Neurological:      Mental Status: She is alert and oriented to person,  place, and time.   Psychiatric:         Mood and Affect: Mood normal.        Neck Ultrasound Report    Indication: Thyroid nodule    Comparison Imaging: yes     Real time high resolution imaging of the thyroid gland was performed in transverse and longitudinal planes.        The right thyroid lobe measured 5.61 cm length x 1.55 cm AP x 2.21 cm in TV dimension. The isthmus measured 0.9 mm in thickness. The left thyroid lobe measured 5.44 cm length x 1.27 cm AP x 1.50 cm in TV dimension.      Nodule #1:   1.right mid thyroid lobe; L 1.10cm x AP 0.78cm x TV 0.97cm   2.hypoechoic  3.microcalcifications and linear colloid present   4.poorly-defined margins   5.grade 2 vascularity     Nodule #2:   1.Right lower lobe; L 1.68cm x AP 1.19cm x TV 1.46cm   2.partially cystic with hyperechoic medial section  3.microcalcifications seen  4.poorly-defined margins without halo, posterior-acoustic enhancement present  5.grade 2 vascularity    Multiple additional subcentimeter spongiform nodules and cysts seen in left lobe.     No pathologic lymph nodes were seen.     Impression: Stable right sided nodules with previously benign FNA, continue to have suspicious features but without significant growth.    Images saved to PACS.     Assessment and plan:     Diagnoses and all orders for this visit:    1. Multiple thyroid nodules (Primary)  Assessment & Plan:  -Right sided nodules continue to have suspicious features including poorly defined borders and microcalcifications, prior FNA benign for both  -No significant change in size or features today to warrant a repeat FNA  -Repeat thyroid ultrasound in 6 months, sooner if she has change in physical exam or compressive symptoms    Orders:  -     US Thyroid; Future         Return in about 6 months (around 1/31/2026) for Thyroid nodule.       Electronically signed by Kyle S Rosenstein, MD

## 2025-07-31 ENCOUNTER — OFFICE VISIT (OUTPATIENT)
Dept: ENDOCRINOLOGY | Facility: CLINIC | Age: 60
End: 2025-07-31
Payer: COMMERCIAL

## 2025-07-31 VITALS
BODY MASS INDEX: 36.06 KG/M2 | HEART RATE: 66 BPM | HEIGHT: 65 IN | DIASTOLIC BLOOD PRESSURE: 69 MMHG | OXYGEN SATURATION: 95 % | SYSTOLIC BLOOD PRESSURE: 132 MMHG | WEIGHT: 216.4 LBS

## 2025-07-31 DIAGNOSIS — E04.2 MULTIPLE THYROID NODULES: Primary | ICD-10-CM

## 2025-07-31 NOTE — ASSESSMENT & PLAN NOTE
-Right sided nodules continue to have suspicious features including poorly defined borders and microcalcifications, prior FNA benign for both  -No significant change in size or features today to warrant a repeat FNA  -Repeat thyroid ultrasound in 6 months, sooner if she has change in physical exam or compressive symptoms

## 2025-08-13 ENCOUNTER — OUTSIDE FACILITY SERVICE (OUTPATIENT)
Dept: GASTROENTEROLOGY | Facility: CLINIC | Age: 60
End: 2025-08-13
Payer: COMMERCIAL

## 2025-08-13 ENCOUNTER — LAB REQUISITION (OUTPATIENT)
Dept: LAB | Facility: HOSPITAL | Age: 60
End: 2025-08-13
Payer: COMMERCIAL

## 2025-08-13 DIAGNOSIS — R13.10 DYSPHAGIA, UNSPECIFIED: ICD-10-CM

## 2025-08-13 DIAGNOSIS — K31.89 OTHER DISEASES OF STOMACH AND DUODENUM: ICD-10-CM

## 2025-08-13 DIAGNOSIS — K22.2 ESOPHAGEAL OBSTRUCTION: ICD-10-CM

## 2025-08-13 DIAGNOSIS — K44.9 DIAPHRAGMATIC HERNIA WITHOUT OBSTRUCTION OR GANGRENE: ICD-10-CM

## 2025-08-13 PROCEDURE — 43249 ESOPH EGD DILATION <30 MM: CPT | Performed by: INTERNAL MEDICINE

## 2025-08-13 PROCEDURE — 43239 EGD BIOPSY SINGLE/MULTIPLE: CPT | Performed by: INTERNAL MEDICINE

## 2025-08-13 PROCEDURE — 88305 TISSUE EXAM BY PATHOLOGIST: CPT | Performed by: INTERNAL MEDICINE

## 2025-08-15 ENCOUNTER — RESULTS FOLLOW-UP (OUTPATIENT)
Dept: LAB | Facility: HOSPITAL | Age: 60
End: 2025-08-15
Payer: COMMERCIAL

## 2025-08-15 LAB
CYTO UR: NORMAL
LAB AP CASE REPORT: NORMAL
LAB AP CLINICAL INFORMATION: NORMAL
PATH REPORT.FINAL DX SPEC: NORMAL
PATH REPORT.GROSS SPEC: NORMAL

## 2025-08-28 ENCOUNTER — OFFICE VISIT (OUTPATIENT)
Dept: GASTROENTEROLOGY | Facility: CLINIC | Age: 60
End: 2025-08-28
Payer: COMMERCIAL

## 2025-08-28 VITALS
OXYGEN SATURATION: 99 % | SYSTOLIC BLOOD PRESSURE: 126 MMHG | HEART RATE: 77 BPM | WEIGHT: 216.4 LBS | TEMPERATURE: 97.3 F | DIASTOLIC BLOOD PRESSURE: 76 MMHG | BODY MASS INDEX: 36.01 KG/M2

## 2025-08-28 DIAGNOSIS — R13.19 ESOPHAGEAL DYSPHAGIA: ICD-10-CM

## 2025-08-28 DIAGNOSIS — K22.2 SCHATZKI'S RING: ICD-10-CM

## 2025-08-28 DIAGNOSIS — K21.9 GASTROESOPHAGEAL REFLUX DISEASE, UNSPECIFIED WHETHER ESOPHAGITIS PRESENT: Primary | ICD-10-CM

## 2025-08-28 RX ORDER — OMEPRAZOLE 40 MG/1
40 CAPSULE, DELAYED RELEASE ORAL DAILY
Qty: 90 CAPSULE | Refills: 3 | Status: SHIPPED | OUTPATIENT
Start: 2025-08-28